# Patient Record
Sex: MALE | Race: WHITE | NOT HISPANIC OR LATINO | Employment: FULL TIME | URBAN - NONMETROPOLITAN AREA
[De-identification: names, ages, dates, MRNs, and addresses within clinical notes are randomized per-mention and may not be internally consistent; named-entity substitution may affect disease eponyms.]

---

## 2023-03-20 DIAGNOSIS — E11.65 POORLY CONTROLLED TYPE 2 DIABETES MELLITUS (MULTI): ICD-10-CM

## 2023-03-20 DIAGNOSIS — E78.5 HYPERLIPIDEMIA, UNSPECIFIED HYPERLIPIDEMIA TYPE: ICD-10-CM

## 2023-03-20 RX ORDER — METFORMIN HYDROCHLORIDE 500 MG/1
500 TABLET, EXTENDED RELEASE ORAL 4 TIMES DAILY
Qty: 120 TABLET | Refills: 11 | OUTPATIENT
Start: 2023-03-20

## 2023-03-20 RX ORDER — METFORMIN HYDROCHLORIDE 500 MG/1
500 TABLET, EXTENDED RELEASE ORAL 4 TIMES DAILY
COMMUNITY
Start: 2020-03-06 | End: 2023-03-23 | Stop reason: SDUPTHER

## 2023-03-20 NOTE — TELEPHONE ENCOUNTER
Needs appointment, can send in enough to last until then once he makes it. Had to cancel appt in January.

## 2023-03-23 RX ORDER — METFORMIN HYDROCHLORIDE 500 MG/1
2000 TABLET, EXTENDED RELEASE ORAL DAILY
Qty: 120 TABLET | Refills: 1 | Status: SHIPPED | OUTPATIENT
Start: 2023-03-23 | End: 2023-05-26 | Stop reason: SDUPTHER

## 2023-03-23 RX ORDER — PRAVASTATIN SODIUM 20 MG/1
20 TABLET ORAL DAILY
COMMUNITY
Start: 2020-01-28 | End: 2023-03-23 | Stop reason: SDUPTHER

## 2023-03-23 RX ORDER — PRAVASTATIN SODIUM 20 MG/1
20 TABLET ORAL DAILY
Qty: 30 TABLET | Refills: 1 | Status: SHIPPED | OUTPATIENT
Start: 2023-03-23 | End: 2023-05-26 | Stop reason: SDUPTHER

## 2023-04-07 LAB
ALANINE AMINOTRANSFERASE (SGPT) (U/L) IN SER/PLAS: 22 U/L (ref 10–52)
ALBUMIN (G/DL) IN SER/PLAS: 4.1 G/DL (ref 3.4–5)
ALKALINE PHOSPHATASE (U/L) IN SER/PLAS: 42 U/L (ref 33–120)
ANION GAP IN SER/PLAS: 10 MMOL/L (ref 10–20)
ASPARTATE AMINOTRANSFERASE (SGOT) (U/L) IN SER/PLAS: 17 U/L (ref 9–39)
BASOPHILS (10*3/UL) IN BLOOD BY AUTOMATED COUNT: 0.07 X10E9/L (ref 0–0.1)
BASOPHILS/100 LEUKOCYTES IN BLOOD BY AUTOMATED COUNT: 1 % (ref 0–2)
BILIRUBIN TOTAL (MG/DL) IN SER/PLAS: 0.5 MG/DL (ref 0–1.2)
CALCIUM (MG/DL) IN SER/PLAS: 9.4 MG/DL (ref 8.6–10.3)
CARBON DIOXIDE, TOTAL (MMOL/L) IN SER/PLAS: 29 MMOL/L (ref 21–32)
CHLORIDE (MMOL/L) IN SER/PLAS: 101 MMOL/L (ref 98–107)
CHOLESTEROL (MG/DL) IN SER/PLAS: 186 MG/DL (ref 0–199)
CHOLESTEROL IN HDL (MG/DL) IN SER/PLAS: 50 MG/DL
CHOLESTEROL/HDL RATIO: 3.7
COBALAMIN (VITAMIN B12) (PG/ML) IN SER/PLAS: 241 PG/ML (ref 211–911)
CREATININE (MG/DL) IN SER/PLAS: 0.81 MG/DL (ref 0.5–1.3)
EOSINOPHILS (10*3/UL) IN BLOOD BY AUTOMATED COUNT: 0.34 X10E9/L (ref 0–0.7)
EOSINOPHILS/100 LEUKOCYTES IN BLOOD BY AUTOMATED COUNT: 4.9 % (ref 0–6)
ERYTHROCYTE DISTRIBUTION WIDTH (RATIO) BY AUTOMATED COUNT: 13.3 % (ref 11.5–14.5)
ERYTHROCYTE MEAN CORPUSCULAR HEMOGLOBIN CONCENTRATION (G/DL) BY AUTOMATED: 32 G/DL (ref 32–36)
ERYTHROCYTE MEAN CORPUSCULAR VOLUME (FL) BY AUTOMATED COUNT: 87 FL (ref 80–100)
ERYTHROCYTES (10*6/UL) IN BLOOD BY AUTOMATED COUNT: 5.48 X10E12/L (ref 4.5–5.9)
ESTIMATED AVERAGE GLUCOSE FOR HBA1C: 263 MG/DL
GFR MALE: >90 ML/MIN/1.73M2
GLUCOSE (MG/DL) IN SER/PLAS: 279 MG/DL (ref 74–99)
HEMATOCRIT (%) IN BLOOD BY AUTOMATED COUNT: 47.8 % (ref 41–52)
HEMOGLOBIN (G/DL) IN BLOOD: 15.3 G/DL (ref 13.5–17.5)
HEMOGLOBIN A1C/HEMOGLOBIN TOTAL IN BLOOD: 10.8 %
IMMATURE GRANULOCYTES/100 LEUKOCYTES IN BLOOD BY AUTOMATED COUNT: 0.3 % (ref 0–0.9)
LDL: 102 MG/DL (ref 0–99)
LEUKOCYTES (10*3/UL) IN BLOOD BY AUTOMATED COUNT: 6.9 X10E9/L (ref 4.4–11.3)
LYMPHOCYTES (10*3/UL) IN BLOOD BY AUTOMATED COUNT: 2.24 X10E9/L (ref 1.2–4.8)
LYMPHOCYTES/100 LEUKOCYTES IN BLOOD BY AUTOMATED COUNT: 32.5 % (ref 13–44)
MONOCYTES (10*3/UL) IN BLOOD BY AUTOMATED COUNT: 0.62 X10E9/L (ref 0.1–1)
MONOCYTES/100 LEUKOCYTES IN BLOOD BY AUTOMATED COUNT: 9 % (ref 2–10)
NEUTROPHILS (10*3/UL) IN BLOOD BY AUTOMATED COUNT: 3.61 X10E9/L (ref 1.2–7.7)
NEUTROPHILS/100 LEUKOCYTES IN BLOOD BY AUTOMATED COUNT: 52.3 % (ref 40–80)
PLATELETS (10*3/UL) IN BLOOD AUTOMATED COUNT: 363 X10E9/L (ref 150–450)
POTASSIUM (MMOL/L) IN SER/PLAS: 4.2 MMOL/L (ref 3.5–5.3)
PROTEIN TOTAL: 6.8 G/DL (ref 6.4–8.2)
SODIUM (MMOL/L) IN SER/PLAS: 136 MMOL/L (ref 136–145)
TRIGLYCERIDE (MG/DL) IN SER/PLAS: 169 MG/DL (ref 0–149)
UREA NITROGEN (MG/DL) IN SER/PLAS: 19 MG/DL (ref 6–23)
VLDL: 34 MG/DL (ref 0–40)

## 2023-04-13 PROBLEM — E11.65 TYPE 2 DIABETES MELLITUS WITH HYPERGLYCEMIA, WITHOUT LONG-TERM CURRENT USE OF INSULIN (MULTI): Status: ACTIVE | Noted: 2023-04-13

## 2023-04-13 PROBLEM — J30.9 ALLERGIC RHINITIS: Status: ACTIVE | Noted: 2023-04-13

## 2023-04-13 PROBLEM — Z91.199 GENERAL PATIENT NONCOMPLIANCE: Status: ACTIVE | Noted: 2023-04-13

## 2023-04-13 PROBLEM — F41.9 ANXIETY: Status: ACTIVE | Noted: 2023-04-13

## 2023-04-13 PROBLEM — I10 HYPERTENSION: Status: ACTIVE | Noted: 2023-04-13

## 2023-04-13 PROBLEM — E55.9 VITAMIN D DEFICIENCY: Status: ACTIVE | Noted: 2023-04-13

## 2023-04-13 PROBLEM — E78.5 HYPERLIPIDEMIA: Status: ACTIVE | Noted: 2023-04-13

## 2023-04-13 RX ORDER — SEMAGLUTIDE 1.34 MG/ML
1 INJECTION, SOLUTION SUBCUTANEOUS
COMMUNITY
End: 2023-07-20 | Stop reason: SDUPTHER

## 2023-04-13 RX ORDER — LISINOPRIL 40 MG/1
40 TABLET ORAL DAILY
COMMUNITY
Start: 2020-01-28 | End: 2023-05-26 | Stop reason: SDUPTHER

## 2023-04-13 RX ORDER — PAROXETINE 30 MG/1
30 TABLET, FILM COATED ORAL DAILY
COMMUNITY
Start: 2019-12-10 | End: 2023-05-26 | Stop reason: SDUPTHER

## 2023-04-13 RX ORDER — PIOGLITAZONEHYDROCHLORIDE 45 MG/1
45 TABLET ORAL DAILY
COMMUNITY
Start: 2021-03-12 | End: 2023-05-26 | Stop reason: SDUPTHER

## 2023-04-13 RX ORDER — VIT C/E/ZN/COPPR/LUTEIN/ZEAXAN 250MG-90MG
25 CAPSULE ORAL
COMMUNITY
Start: 2020-10-23

## 2023-04-13 RX ORDER — GLIMEPIRIDE 4 MG/1
4 TABLET ORAL 2 TIMES DAILY
COMMUNITY
Start: 2019-11-18 | End: 2023-05-26 | Stop reason: SDUPTHER

## 2023-04-13 RX ORDER — MULTIVITAMIN
1 TABLET ORAL DAILY
COMMUNITY

## 2023-04-14 ENCOUNTER — OFFICE VISIT (OUTPATIENT)
Dept: PRIMARY CARE | Facility: CLINIC | Age: 58
End: 2023-04-14
Payer: COMMERCIAL

## 2023-04-14 VITALS
WEIGHT: 223.5 LBS | HEART RATE: 64 BPM | HEIGHT: 69 IN | DIASTOLIC BLOOD PRESSURE: 76 MMHG | SYSTOLIC BLOOD PRESSURE: 138 MMHG | BODY MASS INDEX: 33.1 KG/M2

## 2023-04-14 DIAGNOSIS — E78.49 OTHER HYPERLIPIDEMIA: ICD-10-CM

## 2023-04-14 DIAGNOSIS — F41.9 ANXIETY: ICD-10-CM

## 2023-04-14 DIAGNOSIS — I10 PRIMARY HYPERTENSION: Primary | ICD-10-CM

## 2023-04-14 DIAGNOSIS — E11.65 TYPE 2 DIABETES MELLITUS WITH HYPERGLYCEMIA, WITHOUT LONG-TERM CURRENT USE OF INSULIN (MULTI): ICD-10-CM

## 2023-04-14 DIAGNOSIS — Z91.199 GENERAL PATIENT NONCOMPLIANCE: ICD-10-CM

## 2023-04-14 PROCEDURE — 3046F HEMOGLOBIN A1C LEVEL >9.0%: CPT | Performed by: FAMILY MEDICINE

## 2023-04-14 PROCEDURE — 4010F ACE/ARB THERAPY RXD/TAKEN: CPT | Performed by: FAMILY MEDICINE

## 2023-04-14 PROCEDURE — 3078F DIAST BP <80 MM HG: CPT | Performed by: FAMILY MEDICINE

## 2023-04-14 PROCEDURE — 3075F SYST BP GE 130 - 139MM HG: CPT | Performed by: FAMILY MEDICINE

## 2023-04-14 PROCEDURE — 1036F TOBACCO NON-USER: CPT | Performed by: FAMILY MEDICINE

## 2023-04-14 PROCEDURE — 99214 OFFICE O/P EST MOD 30 MIN: CPT | Performed by: FAMILY MEDICINE

## 2023-04-14 NOTE — PROGRESS NOTES
Patient presents for periodic surveillance of chronic medical problems.     Subjective  Vince Valle is a 57 y.o. male who presents for Med check.  HPI  DM poorly controlled, current a1c 10.8, and for last two years at minimum has been above 10.3.  struggles with dietary , medication and followup compliance.  Risks reviewed.  Has declined insulin and endocrine evaluation consistently.   He states he misses the ozempic, and the  morning doses of his medicines frequently.   Advised I am referring him to endocrine .    HTN, stable  Hyperlipidemia, on statin    Review of Systems   All other systems reviewed and are negative.  .    Objective     Visit Vitals  /76   Pulse 64      Physical Exam  Vitals and nursing note reviewed.   Constitutional:       General: He is not in acute distress.     Appearance: Normal appearance. He is not toxic-appearing.   HENT:      Head: Normocephalic and atraumatic.   Cardiovascular:      Rate and Rhythm: Normal rate and regular rhythm.      Heart sounds: No murmur heard.  Pulmonary:      Effort: Pulmonary effort is normal.      Breath sounds: Normal breath sounds.   Abdominal:      Palpations: Abdomen is soft.   Musculoskeletal:      Cervical back: Neck supple. No rigidity.      Comments:     Skin:     General: Skin is warm and dry.   Neurological:      General: No focal deficit present.      Mental Status: He is alert and oriented to person, place, and time.   Psychiatric:         Mood and Affect: Mood normal.         Behavior: Behavior normal.       Orders Only on 04/07/2023   Component Date Value Ref Range Status    Cholesterol 04/07/2023 186  0 - 199 mg/dL Final    Comment: .      AGE      DESIRABLE   BORDERLINE HIGH   HIGH     0-19 Y     0 - 169       170 - 199     >/= 200    20-24 Y     0 - 189       190 - 224     >/= 225         >24 Y     0 - 199       200 - 239     >/= 240   **All ranges are based on fasting samples. Specific   therapeutic targets will vary based on  patient-specific   cardiac risk.  .   Pediatric guidelines reference:Pediatrics 2011, 128(S5).   Adult guidelines reference: NCEP ATPIII Guidelines,     STEPHANIE 2001, 258:2486-97  .   Venipuncture immediately after or during the    administration of Metamizole may lead to falsely   low results. Testing should be performed immediately   prior to Metamizole dosing.      HDL 04/07/2023 50.0  mg/dL Final    Comment: .      AGE      VERY LOW   LOW     NORMAL    HIGH       0-19 Y       < 35   < 40     40-45     ----    20-24 Y       ----   < 40       >45     ----      >24 Y       ----   < 40     40-60      >60  .      Cholesterol/HDL Ratio 04/07/2023 3.7   Final    Comment: REF VALUES  DESIRABLE  < 3.4  HIGH RISK  > 5.0      LDL 04/07/2023 102 (H)  0 - 99 mg/dL Final    Comment: .                           NEAR      BORD      AGE      DESIRABLE  OPTIMAL    HIGH     HIGH     VERY HIGH     0-19 Y     0 - 109     ---    110-129   >/= 130     ----    20-24 Y     0 - 119     ---    120-159   >/= 160     ----      >24 Y     0 -  99   100-129  130-159   160-189     >/=190  .      VLDL 04/07/2023 34  0 - 40 mg/dL Final    Triglycerides 04/07/2023 169 (H)  0 - 149 mg/dL Final    Comment: .      AGE      DESIRABLE   BORDERLINE HIGH   HIGH     VERY HIGH   0 D-90 D    19 - 174         ----         ----        ----  91 D- 9 Y     0 -  74        75 -  99     >/= 100      ----    10-19 Y     0 -  89        90 - 129     >/= 130      ----    20-24 Y     0 - 114       115 - 149     >/= 150      ----         >24 Y     0 - 149       150 - 199    200- 499    >/= 500  .   Venipuncture immediately after or during the    administration of Metamizole may lead to falsely   low results. Testing should be performed immediately   prior to Metamizole dosing.     Orders Only on 04/07/2023   Component Date Value Ref Range Status    WBC 04/07/2023 6.9  4.4 - 11.3 x10E9/L Final    RBC 04/07/2023 5.48  4.50 - 5.90 x10E12/L Final    Hemoglobin 04/07/2023 15.3   13.5 - 17.5 g/dL Final    Hematocrit 04/07/2023 47.8  41.0 - 52.0 % Final    MCV 04/07/2023 87  80 - 100 fL Final    MCHC 04/07/2023 32.0  32.0 - 36.0 g/dL Final    Platelets 04/07/2023 363  150 - 450 x10E9/L Final    RDW 04/07/2023 13.3  11.5 - 14.5 % Final    Neutrophils % 04/07/2023 52.3  40.0 - 80.0 % Final    Immature Granulocytes %, Automated 04/07/2023 0.3  0.0 - 0.9 % Final    Comment:  Immature Granulocyte Count (IG) includes promyelocytes,    myelocytes and metamyelocytes but does not include bands.   Percent differential counts (%) should be interpreted in the   context of the absolute cell counts (cells/L).      Lymphocytes % 04/07/2023 32.5  13.0 - 44.0 % Final    Monocytes % 04/07/2023 9.0  2.0 - 10.0 % Final    Eosinophils % 04/07/2023 4.9  0.0 - 6.0 % Final    Basophils % 04/07/2023 1.0  0.0 - 2.0 % Final    Neutrophils Absolute 04/07/2023 3.61  1.20 - 7.70 x10E9/L Final    Comment:  Percent differential counts (%) should be interpreted in the   context of the absolute cell counts (cells/L).      Lymphocytes Absolute 04/07/2023 2.24  1.20 - 4.80 x10E9/L Final    Monocytes Absolute 04/07/2023 0.62  0.10 - 1.00 x10E9/L Final    Eosinophils Absolute 04/07/2023 0.34  0.00 - 0.70 x10E9/L Final    Basophils Absolute 04/07/2023 0.07  0.00 - 0.10 x10E9/L Final   Orders Only on 04/07/2023   Component Date Value Ref Range Status    Vitamin B-12 04/07/2023 241  211 - 911 pg/mL Final   Orders Only on 04/07/2023   Component Date Value Ref Range Status    Glucose 04/07/2023 279 (H)  74 - 99 mg/dL Final    Sodium 04/07/2023 136  136 - 145 mmol/L Final    Potassium 04/07/2023 4.2  3.5 - 5.3 mmol/L Final    Chloride 04/07/2023 101  98 - 107 mmol/L Final    Bicarbonate 04/07/2023 29  21 - 32 mmol/L Final    Anion Gap 04/07/2023 10  10 - 20 mmol/L Final    Urea Nitrogen 04/07/2023 19  6 - 23 mg/dL Final    Creatinine 04/07/2023 0.81  0.50 - 1.30 mg/dL Final    GFR MALE 04/07/2023 >90  >90 mL/min/1.73m2 Final     Comment:  CALCULATIONS OF ESTIMATED GFR ARE PERFORMED   USING THE 2021 CKD-EPI STUDY REFIT EQUATION   WITHOUT THE RACE VARIABLE FOR THE IDMS-TRACEABLE   CREATININE METHODS.    https://jasn.asnjournals.org/content/early/2021/09/22/ASN.4692773563      Calcium 04/07/2023 9.4  8.6 - 10.3 mg/dL Final    Albumin 04/07/2023 4.1  3.4 - 5.0 g/dL Final    Alkaline Phosphatase 04/07/2023 42  33 - 120 U/L Final    Total Protein 04/07/2023 6.8  6.4 - 8.2 g/dL Final    AST 04/07/2023 17  9 - 39 U/L Final    Total Bilirubin 04/07/2023 0.5  0.0 - 1.2 mg/dL Final    ALT (SGPT) 04/07/2023 22  10 - 52 U/L Final    Comment:  Patients treated with Sulfasalazine may generate    falsely decreased results for ALT.     Orders Only on 04/07/2023   Component Date Value Ref Range Status    Hemoglobin A1C 04/07/2023 10.8 (A)  % Final    Comment:      Diagnosis of Diabetes-Adults   Non-Diabetic: < or = 5.6%   Increased risk for developing diabetes: 5.7-6.4%   Diagnostic of diabetes: > or = 6.5%  .       Monitoring of Diabetes                Age (y)     Therapeutic Goal (%)   Adults:          >18           <7.0   Pediatrics:    13-18           <7.5                   7-12           <8.0                   0- 6            7.5-8.5   American Diabetes Association. Diabetes Care 33(S1), Jan 2010.      Estimated Average Glucose 04/07/2023 263  MG/DL Final      Assessment/Plan   Problem List Items Addressed This Visit          Circulatory    Hypertension - Primary       Endocrine/Metabolic    Type 2 diabetes mellitus with hyperglycemia, without long-term current use of insulin (CMS/[facility])    Relevant Orders    Follow Up In Primary Care    Referral to Endocrinology       Other    Anxiety    General patient noncompliance    Hyperlipidemia              Rosalind Reyes MD

## 2023-05-26 DIAGNOSIS — E11.65 POORLY CONTROLLED TYPE 2 DIABETES MELLITUS (MULTI): ICD-10-CM

## 2023-05-26 DIAGNOSIS — F41.9 ANXIETY: ICD-10-CM

## 2023-05-26 DIAGNOSIS — E78.5 HYPERLIPIDEMIA, UNSPECIFIED HYPERLIPIDEMIA TYPE: ICD-10-CM

## 2023-05-26 DIAGNOSIS — I10 PRIMARY HYPERTENSION: ICD-10-CM

## 2023-05-26 RX ORDER — PIOGLITAZONEHYDROCHLORIDE 45 MG/1
45 TABLET ORAL DAILY
Qty: 90 TABLET | Refills: 0 | Status: SHIPPED | OUTPATIENT
Start: 2023-05-26

## 2023-05-26 RX ORDER — PRAVASTATIN SODIUM 20 MG/1
20 TABLET ORAL DAILY
Qty: 90 TABLET | Refills: 0 | Status: SHIPPED | OUTPATIENT
Start: 2023-05-26 | End: 2023-08-16 | Stop reason: SDUPTHER

## 2023-05-26 RX ORDER — METFORMIN HYDROCHLORIDE 500 MG/1
2000 TABLET, EXTENDED RELEASE ORAL DAILY
Qty: 120 TABLET | Refills: 0 | Status: SHIPPED | OUTPATIENT
Start: 2023-05-26 | End: 2023-06-27 | Stop reason: SDUPTHER

## 2023-05-26 RX ORDER — LISINOPRIL 40 MG/1
40 TABLET ORAL DAILY
Qty: 90 TABLET | Refills: 0 | Status: SHIPPED | OUTPATIENT
Start: 2023-05-26 | End: 2023-08-16 | Stop reason: SDUPTHER

## 2023-05-26 RX ORDER — PAROXETINE 30 MG/1
30 TABLET, FILM COATED ORAL DAILY
Qty: 90 TABLET | Refills: 0 | Status: SHIPPED | OUTPATIENT
Start: 2023-05-26 | End: 2023-08-16 | Stop reason: SDUPTHER

## 2023-05-26 RX ORDER — GLIMEPIRIDE 4 MG/1
4 TABLET ORAL 2 TIMES DAILY
Qty: 180 TABLET | Refills: 0 | Status: SHIPPED | OUTPATIENT
Start: 2023-05-26 | End: 2023-08-16 | Stop reason: SDUPTHER

## 2023-05-30 DIAGNOSIS — E11.65 POORLY CONTROLLED TYPE 2 DIABETES MELLITUS (MULTI): ICD-10-CM

## 2023-05-30 RX ORDER — METFORMIN HYDROCHLORIDE 500 MG/1
2000 TABLET, EXTENDED RELEASE ORAL DAILY
Qty: 120 TABLET | Refills: 5 | OUTPATIENT
Start: 2023-05-30

## 2023-06-27 ENCOUNTER — TELEPHONE (OUTPATIENT)
Dept: PRIMARY CARE | Facility: CLINIC | Age: 58
End: 2023-06-27
Payer: COMMERCIAL

## 2023-06-27 DIAGNOSIS — E11.65 POORLY CONTROLLED TYPE 2 DIABETES MELLITUS (MULTI): ICD-10-CM

## 2023-06-27 RX ORDER — METFORMIN HYDROCHLORIDE 500 MG/1
2000 TABLET, EXTENDED RELEASE ORAL DAILY
Qty: 120 TABLET | Refills: 1 | Status: SHIPPED | OUTPATIENT
Start: 2023-06-27 | End: 2024-01-18 | Stop reason: SDUPTHER

## 2023-07-20 DIAGNOSIS — E11.65 TYPE 2 DIABETES MELLITUS WITH HYPERGLYCEMIA, WITHOUT LONG-TERM CURRENT USE OF INSULIN (MULTI): ICD-10-CM

## 2023-07-20 RX ORDER — SEMAGLUTIDE 1.34 MG/ML
1 INJECTION, SOLUTION SUBCUTANEOUS
Qty: 3 ML | Refills: 0 | Status: SHIPPED | OUTPATIENT
Start: 2023-07-20 | End: 2023-10-13

## 2023-08-16 DIAGNOSIS — E11.65 POORLY CONTROLLED TYPE 2 DIABETES MELLITUS (MULTI): ICD-10-CM

## 2023-08-16 DIAGNOSIS — F41.9 ANXIETY: ICD-10-CM

## 2023-08-16 DIAGNOSIS — E78.5 HYPERLIPIDEMIA, UNSPECIFIED HYPERLIPIDEMIA TYPE: ICD-10-CM

## 2023-08-16 DIAGNOSIS — I10 PRIMARY HYPERTENSION: ICD-10-CM

## 2023-08-16 RX ORDER — PIOGLITAZONEHYDROCHLORIDE 45 MG/1
45 TABLET ORAL DAILY
Qty: 90 TABLET | Refills: 1 | Status: CANCELLED | OUTPATIENT
Start: 2023-08-16

## 2023-08-17 RX ORDER — GLIMEPIRIDE 4 MG/1
4 TABLET ORAL 2 TIMES DAILY
Qty: 180 TABLET | Refills: 1 | Status: SHIPPED | OUTPATIENT
Start: 2023-08-17

## 2023-08-17 RX ORDER — PRAVASTATIN SODIUM 20 MG/1
20 TABLET ORAL DAILY
Qty: 90 TABLET | Refills: 1 | Status: SHIPPED | OUTPATIENT
Start: 2023-08-17 | End: 2024-01-16 | Stop reason: SDUPTHER

## 2023-08-17 RX ORDER — LISINOPRIL 40 MG/1
40 TABLET ORAL DAILY
Qty: 90 TABLET | Refills: 1 | Status: SHIPPED | OUTPATIENT
Start: 2023-08-17 | End: 2024-01-16 | Stop reason: SDUPTHER

## 2023-08-17 RX ORDER — PAROXETINE 30 MG/1
30 TABLET, FILM COATED ORAL DAILY
Qty: 90 TABLET | Refills: 1 | Status: SHIPPED | OUTPATIENT
Start: 2023-08-17 | End: 2024-01-16 | Stop reason: SDUPTHER

## 2023-09-05 DIAGNOSIS — E11.65 POORLY CONTROLLED TYPE 2 DIABETES MELLITUS (MULTI): ICD-10-CM

## 2023-09-05 RX ORDER — PIOGLITAZONEHYDROCHLORIDE 45 MG/1
45 TABLET ORAL DAILY
Qty: 90 TABLET | Refills: 1 | OUTPATIENT
Start: 2023-09-05

## 2023-09-05 RX ORDER — PIOGLITAZONEHYDROCHLORIDE 45 MG/1
45 TABLET ORAL DAILY
Qty: 90 TABLET | Refills: 0 | OUTPATIENT
Start: 2023-09-05

## 2023-09-08 DIAGNOSIS — E11.65 POORLY CONTROLLED TYPE 2 DIABETES MELLITUS (MULTI): ICD-10-CM

## 2023-09-08 RX ORDER — PIOGLITAZONEHYDROCHLORIDE 45 MG/1
45 TABLET ORAL DAILY
Qty: 90 TABLET | Refills: 0 | OUTPATIENT
Start: 2023-09-08

## 2023-09-21 DIAGNOSIS — E11.65 POORLY CONTROLLED TYPE 2 DIABETES MELLITUS (MULTI): ICD-10-CM

## 2023-09-21 RX ORDER — METFORMIN HYDROCHLORIDE 500 MG/1
2000 TABLET, EXTENDED RELEASE ORAL DAILY
Qty: 120 TABLET | Refills: 1 | OUTPATIENT
Start: 2023-09-21

## 2023-10-13 ENCOUNTER — OFFICE VISIT (OUTPATIENT)
Dept: PRIMARY CARE | Facility: CLINIC | Age: 58
End: 2023-10-13
Payer: COMMERCIAL

## 2023-10-13 VITALS
HEART RATE: 64 BPM | BODY MASS INDEX: 32.44 KG/M2 | HEIGHT: 69 IN | SYSTOLIC BLOOD PRESSURE: 132 MMHG | DIASTOLIC BLOOD PRESSURE: 68 MMHG | WEIGHT: 219 LBS

## 2023-10-13 DIAGNOSIS — I10 PRIMARY HYPERTENSION: ICD-10-CM

## 2023-10-13 DIAGNOSIS — E78.49 OTHER HYPERLIPIDEMIA: ICD-10-CM

## 2023-10-13 DIAGNOSIS — E11.65 TYPE 2 DIABETES MELLITUS WITH HYPERGLYCEMIA, WITHOUT LONG-TERM CURRENT USE OF INSULIN (MULTI): ICD-10-CM

## 2023-10-13 DIAGNOSIS — Z28.21 INFLUENZA VACCINATION DECLINED: Primary | ICD-10-CM

## 2023-10-13 PROCEDURE — 99214 OFFICE O/P EST MOD 30 MIN: CPT | Performed by: FAMILY MEDICINE

## 2023-10-13 PROCEDURE — 3046F HEMOGLOBIN A1C LEVEL >9.0%: CPT | Performed by: FAMILY MEDICINE

## 2023-10-13 PROCEDURE — 1036F TOBACCO NON-USER: CPT | Performed by: FAMILY MEDICINE

## 2023-10-13 PROCEDURE — 3075F SYST BP GE 130 - 139MM HG: CPT | Performed by: FAMILY MEDICINE

## 2023-10-13 PROCEDURE — 3078F DIAST BP <80 MM HG: CPT | Performed by: FAMILY MEDICINE

## 2023-10-13 PROCEDURE — 4010F ACE/ARB THERAPY RXD/TAKEN: CPT | Performed by: FAMILY MEDICINE

## 2023-10-13 RX ORDER — SEMAGLUTIDE 2.68 MG/ML
INJECTION, SOLUTION SUBCUTANEOUS
COMMUNITY
End: 2024-04-15 | Stop reason: SDUPTHER

## 2023-10-13 RX ORDER — EMPAGLIFLOZIN 10 MG/1
1 TABLET, FILM COATED ORAL DAILY
COMMUNITY
Start: 2023-08-04 | End: 2023-11-10 | Stop reason: ALTCHOICE

## 2023-10-13 NOTE — PROGRESS NOTES
Patient presents for periodic surveillance of chronic medical problems.     Subjective  Vince Valle is a 58 y.o. male who presents for Annual Exam.  HPI  DM, has been to endocrine, has followup appt with labs next week, meds adjusted working on getting more steps in  HTN, stable  Hyperlipidemia on statin  Anxiety, stable on current regimen  Denies problems or concerns      Review of Systems   All other systems reviewed and are negative.  .    Objective     Visit Vitals  /68   Pulse 64      Physical Exam  Vitals and nursing note reviewed.   Constitutional:       Appearance: Normal appearance.   HENT:      Head: Normocephalic and atraumatic.      Right Ear: External ear normal.   Cardiovascular:      Rate and Rhythm: Normal rate and regular rhythm.      Heart sounds: No murmur heard.  Pulmonary:      Effort: Pulmonary effort is normal.      Breath sounds: Normal breath sounds.   Musculoskeletal:      Comments: Normal gait   Skin:     General: Skin is warm and dry.   Neurological:      General: No focal deficit present.      Mental Status: He is alert and oriented to person, place, and time.   Psychiatric:         Mood and Affect: Mood normal.         Behavior: Behavior normal.         Assessment/Plan   Problem List Items Addressed This Visit       Hyperlipidemia    Hypertension    Type 2 diabetes mellitus with hyperglycemia, without long-term current use of insulin (CMS/[facility])     Other Visit Diagnoses       Influenza vaccination declined    -  Primary                   Rosalind Reyes MD

## 2023-11-03 ENCOUNTER — LAB (OUTPATIENT)
Dept: LAB | Facility: LAB | Age: 58
End: 2023-11-03
Payer: COMMERCIAL

## 2023-11-03 DIAGNOSIS — E11.65 TYPE 2 DIABETES MELLITUS WITH HYPERGLYCEMIA (MULTI): Primary | ICD-10-CM

## 2023-11-03 DIAGNOSIS — E78.5 HYPERLIPIDEMIA, UNSPECIFIED: ICD-10-CM

## 2023-11-03 LAB
ALBUMIN SERPL BCP-MCNC: 4.3 G/DL (ref 3.4–5)
ANION GAP SERPL CALC-SCNC: 13 MMOL/L (ref 10–20)
BUN SERPL-MCNC: 19 MG/DL (ref 6–23)
CALCIUM SERPL-MCNC: 9.5 MG/DL (ref 8.6–10.3)
CHLORIDE SERPL-SCNC: 103 MMOL/L (ref 98–107)
CHOLEST SERPL-MCNC: 158 MG/DL (ref 0–199)
CHOLESTEROL/HDL RATIO: 3.5
CO2 SERPL-SCNC: 27 MMOL/L (ref 21–32)
CREAT SERPL-MCNC: 0.77 MG/DL (ref 0.5–1.3)
CREAT UR-MCNC: 81.3 MG/DL (ref 20–370)
EST. AVERAGE GLUCOSE BLD GHB EST-MCNC: 189 MG/DL
GFR SERPL CREATININE-BSD FRML MDRD: >90 ML/MIN/1.73M*2
GLUCOSE SERPL-MCNC: 152 MG/DL (ref 74–99)
HBA1C MFR BLD: 8.2 %
HDLC SERPL-MCNC: 45 MG/DL
LDLC SERPL CALC-MCNC: 82 MG/DL
MICROALBUMIN UR-MCNC: 28.3 MG/L
MICROALBUMIN/CREAT UR: 34.8 UG/MG CREAT
NON HDL CHOLESTEROL: 113 MG/DL (ref 0–149)
PHOSPHATE SERPL-MCNC: 4.3 MG/DL (ref 2.5–4.9)
POTASSIUM SERPL-SCNC: 4.4 MMOL/L (ref 3.5–5.3)
SODIUM SERPL-SCNC: 139 MMOL/L (ref 136–145)
TRIGL SERPL-MCNC: 153 MG/DL (ref 0–149)
VLDL: 31 MG/DL (ref 0–40)

## 2023-11-03 PROCEDURE — 82570 ASSAY OF URINE CREATININE: CPT

## 2023-11-03 PROCEDURE — 80069 RENAL FUNCTION PANEL: CPT

## 2023-11-03 PROCEDURE — 36415 COLL VENOUS BLD VENIPUNCTURE: CPT

## 2023-11-03 PROCEDURE — 82043 UR ALBUMIN QUANTITATIVE: CPT

## 2023-11-03 PROCEDURE — 83036 HEMOGLOBIN GLYCOSYLATED A1C: CPT

## 2023-11-03 PROCEDURE — 80061 LIPID PANEL: CPT

## 2023-11-10 ENCOUNTER — OFFICE VISIT (OUTPATIENT)
Dept: ENDOCRINOLOGY | Facility: HOSPITAL | Age: 58
End: 2023-11-10
Payer: COMMERCIAL

## 2023-11-10 VITALS
DIASTOLIC BLOOD PRESSURE: 85 MMHG | HEART RATE: 66 BPM | SYSTOLIC BLOOD PRESSURE: 153 MMHG | WEIGHT: 211 LBS | HEIGHT: 69 IN | TEMPERATURE: 97.2 F | BODY MASS INDEX: 31.25 KG/M2 | OXYGEN SATURATION: 97 %

## 2023-11-10 DIAGNOSIS — E11.65 TYPE 2 DIABETES MELLITUS WITH HYPERGLYCEMIA, WITHOUT LONG-TERM CURRENT USE OF INSULIN (MULTI): Primary | ICD-10-CM

## 2023-11-10 DIAGNOSIS — E78.49 OTHER HYPERLIPIDEMIA: ICD-10-CM

## 2023-11-10 LAB — GLUCOSE BLD MANUAL STRIP-MCNC: 143 MG/DL (ref 74–99)

## 2023-11-10 PROCEDURE — 99215 OFFICE O/P EST HI 40 MIN: CPT | Performed by: STUDENT IN AN ORGANIZED HEALTH CARE EDUCATION/TRAINING PROGRAM

## 2023-11-10 PROCEDURE — 82947 ASSAY GLUCOSE BLOOD QUANT: CPT | Performed by: STUDENT IN AN ORGANIZED HEALTH CARE EDUCATION/TRAINING PROGRAM

## 2023-11-10 PROCEDURE — 3077F SYST BP >= 140 MM HG: CPT | Performed by: STUDENT IN AN ORGANIZED HEALTH CARE EDUCATION/TRAINING PROGRAM

## 2023-11-10 PROCEDURE — 3048F LDL-C <100 MG/DL: CPT | Performed by: STUDENT IN AN ORGANIZED HEALTH CARE EDUCATION/TRAINING PROGRAM

## 2023-11-10 PROCEDURE — 36416 COLLJ CAPILLARY BLOOD SPEC: CPT | Performed by: STUDENT IN AN ORGANIZED HEALTH CARE EDUCATION/TRAINING PROGRAM

## 2023-11-10 PROCEDURE — 3061F NEG MICROALBUMINURIA REV: CPT | Performed by: STUDENT IN AN ORGANIZED HEALTH CARE EDUCATION/TRAINING PROGRAM

## 2023-11-10 PROCEDURE — 3079F DIAST BP 80-89 MM HG: CPT | Performed by: STUDENT IN AN ORGANIZED HEALTH CARE EDUCATION/TRAINING PROGRAM

## 2023-11-10 PROCEDURE — 4010F ACE/ARB THERAPY RXD/TAKEN: CPT | Performed by: STUDENT IN AN ORGANIZED HEALTH CARE EDUCATION/TRAINING PROGRAM

## 2023-11-10 PROCEDURE — 1036F TOBACCO NON-USER: CPT | Performed by: STUDENT IN AN ORGANIZED HEALTH CARE EDUCATION/TRAINING PROGRAM

## 2023-11-10 PROCEDURE — 3052F HG A1C>EQUAL 8.0%<EQUAL 9.0%: CPT | Performed by: STUDENT IN AN ORGANIZED HEALTH CARE EDUCATION/TRAINING PROGRAM

## 2023-11-10 SDOH — ECONOMIC STABILITY: FOOD INSECURITY: WITHIN THE PAST 12 MONTHS, YOU WORRIED THAT YOUR FOOD WOULD RUN OUT BEFORE YOU GOT MONEY TO BUY MORE.: NEVER TRUE

## 2023-11-10 SDOH — ECONOMIC STABILITY: FOOD INSECURITY: WITHIN THE PAST 12 MONTHS, THE FOOD YOU BOUGHT JUST DIDN'T LAST AND YOU DIDN'T HAVE MONEY TO GET MORE.: NEVER TRUE

## 2023-11-10 ASSESSMENT — ENCOUNTER SYMPTOMS
DIARRHEA: 0
NECK STIFFNESS: 0
DEPRESSION: 0
FREQUENCY: 0
DIFFICULTY URINATING: 0
DIZZINESS: 0
WHEEZING: 0
ABDOMINAL DISTENTION: 0
CHILLS: 0
EYE DISCHARGE: 0
POLYDIPSIA: 0
LOSS OF SENSATION IN FEET: 0
ACTIVITY CHANGE: 0
NUMBNESS: 0
VOMITING: 0
FATIGUE: 0
APPETITE CHANGE: 0
LIGHT-HEADEDNESS: 0
COLOR CHANGE: 0
NAUSEA: 0
ABDOMINAL PAIN: 0
COUGH: 0
AGITATION: 0
HALLUCINATIONS: 0
CHEST TIGHTNESS: 0
OCCASIONAL FEELINGS OF UNSTEADINESS: 0
POLYPHAGIA: 0
FLANK PAIN: 0
NECK PAIN: 0
CONSTIPATION: 0
SHORTNESS OF BREATH: 0
CONFUSION: 0
TREMORS: 0

## 2023-11-10 ASSESSMENT — LIFESTYLE VARIABLES
SKIP TO QUESTIONS 9-10: 1
HOW OFTEN DO YOU HAVE SIX OR MORE DRINKS ON ONE OCCASION: NEVER
HOW OFTEN DO YOU HAVE A DRINK CONTAINING ALCOHOL: NEVER
AUDIT-C TOTAL SCORE: 0
HOW MANY STANDARD DRINKS CONTAINING ALCOHOL DO YOU HAVE ON A TYPICAL DAY: PATIENT DOES NOT DRINK

## 2023-11-10 ASSESSMENT — PATIENT HEALTH QUESTIONNAIRE - PHQ9
1. LITTLE INTEREST OR PLEASURE IN DOING THINGS: NOT AT ALL
2. FEELING DOWN, DEPRESSED OR HOPELESS: NOT AT ALL
SUM OF ALL RESPONSES TO PHQ9 QUESTIONS 1 & 2: 0

## 2023-11-10 ASSESSMENT — PAIN SCALES - GENERAL: PAINLEVEL: 4

## 2023-11-10 NOTE — PATIENT INSTRUCTIONS
You are doing a great Job.  At this time no need for Insulin  Continue ozempic 2 mg weekly  Continue Metformin, glimepiride and pioglitazone  Increase Jardiance to 25 mg daily  Continue to monitor BG.  If low BG < 70 frequent let me know  Continue to watch diet and exercise  Continue pravastatin  Continue losartan  Ophthalmology follow up  Blood work in 3 months few days before you come in  Follow up in3 months

## 2023-11-10 NOTE — PROGRESS NOTES
Patient coming in for follow up for T2DM    Subjective   Vince Valle is a 58 y.o. male who presents for follow up for Type 2 diabetes mellitus.     Lab Results   Component Value Date    HGBA1C 8.2 (H) 11/03/2023     Mr. Valle is a 58 year old M with hx of T2DM, HTN and HLD referred for Diabetes management  Diabetes dx > 10 yrs ago  Last A1c November 2023 8.2%  Has been watching his diet trying to be more active  current meds:  Ozempic 2 mg weekly since August  Metformin 1000 mg twice daily  Pioglitazone 45 mg daily  Glimepiride 4 mg twice daily  Jardiance 10 mg OD  BG in am: 150-160  BG at before dinner: 130-140  BG after dinner: 180-190  Has been doing portion control  Breakfast: Toast with butter  Lunch: Granola bar and banana  Not snacking in between meals  Dinner: Pizza 2 slices. Moderation with salad  Hasn't been following with ophthalmology. Not yet  No numbness or tingling  Nephropathy on losartan  On pravastatin last LDL: 102  Joint ache.  More energetic and less achy  No nausea no vomiting  No abdominal pain  No issues with urine.  Urinating less frequent  Nocturia once a night    Review of Systems   Constitutional:  Negative for activity change, appetite change, chills and fatigue.   HENT:  Negative for congestion.    Eyes:  Negative for discharge.   Respiratory:  Negative for cough, chest tightness, shortness of breath and wheezing.    Cardiovascular:  Negative for chest pain.   Gastrointestinal:  Negative for abdominal distention, abdominal pain, constipation, diarrhea, nausea and vomiting.   Endocrine: Negative for cold intolerance, heat intolerance, polydipsia, polyphagia and polyuria.   Genitourinary:  Negative for difficulty urinating, flank pain, frequency and urgency.   Musculoskeletal:  Negative for neck pain and neck stiffness.   Skin:  Negative for color change and rash.   Neurological:  Negative for dizziness, tremors, syncope, light-headedness and numbness.   Psychiatric/Behavioral:   "Negative for agitation, confusion and hallucinations.        Objective   /85 (BP Location: Right arm, Patient Position: Sitting, BP Cuff Size: Adult)   Pulse 66   Temp 36.2 °C (97.2 °F) (Temporal)   Ht 1.753 m (5' 9\")   Wt 95.7 kg (211 lb)   SpO2 97%   BMI 31.16 kg/m²   Physical Exam  Constitutional:       General: He is not in acute distress.     Appearance: Normal appearance.   Eyes:      Extraocular Movements: Extraocular movements intact.      Pupils: Pupils are equal, round, and reactive to light.   Cardiovascular:      Rate and Rhythm: Normal rate and regular rhythm.   Pulmonary:      Effort: Pulmonary effort is normal. No respiratory distress.      Breath sounds: Normal breath sounds.   Abdominal:      General: Bowel sounds are normal.      Palpations: Abdomen is soft.      Tenderness: There is no abdominal tenderness.   Skin:     Coloration: Skin is not jaundiced or pale.      Findings: No erythema or rash.   Neurological:      General: No focal deficit present.      Mental Status: He is alert and oriented to person, place, and time.      Deep Tendon Reflexes: Reflexes normal.   Psychiatric:         Mood and Affect: Mood normal.         Behavior: Behavior normal.         Lab Review  Glucose (mg/dL)   Date Value   11/03/2023 152 (H)   04/07/2023 279 (H)   02/28/2022 294 (H)   03/06/2021 315 (H)     Hemoglobin A1C (%)   Date Value   11/03/2023 8.2 (H)   04/07/2023 10.8 (A)   09/30/2022 10.3 (A)   02/28/2022 11.2 (A)     Bicarbonate (mmol/L)   Date Value   11/03/2023 27   04/07/2023 29   02/28/2022 29   03/06/2021 29     Urea Nitrogen (mg/dL)   Date Value   11/03/2023 19   04/07/2023 19   02/28/2022 19   03/06/2021 16     Creatinine (mg/dL)   Date Value   11/03/2023 0.77   04/07/2023 0.81   02/28/2022 0.72   03/06/2021 0.77     Lab Results   Component Value Date    CHOL 158 11/03/2023    CHOL 186 04/07/2023    CHOL 214 (H) 02/28/2022     Lab Results   Component Value Date    HDL 45.0 11/03/2023    " "HDL 50.0 04/07/2023    HDL 52.0 02/28/2022     Lab Results   Component Value Date    LDLCALC 82 11/03/2023     Lab Results   Component Value Date    TRIG 153 (H) 11/03/2023    TRIG 169 (H) 04/07/2023    TRIG 167 (H) 02/28/2022     No components found for: \"CHOLHDL\"   Lab Results   Component Value Date    TSH 2.63 02/28/2022       Assessment/Plan     Mr. Valle is a 58 year old M with hx of T2DM, HTN and HLD referred for Diabetes management  Diabetes dx > 10 yrs ago  Last A1c November 2023 8.2%  Has been watching his diet trying to be more active  current meds:  Ozempic 2 mg weekly since August  Metformin 1000 mg twice daily  Pioglitazone 45 mg daily  Glimepiride 4 mg twice daily  Jardiance 10 mg OD  BG in am: 150-160, BG at before dinner: 130-140, BG after dinner: 180-190  Has been doing portion control  Hasn't been following with ophthalmology. Not yet  No numbness or tingling  Nephropathy on losartan  On pravastatin last LDL: 102    Plan:  At this time no need for Insulin  Continue ozempic 2 mg weekly  Continue Metformin, glimepiride and pioglitazone  Increase Jardiance to 25 mg daily  Continue to monitor BG.  If low BG < 70 frequent we asked patient to let us know  Continue to watch diet and exercise  Continue pravastatin  Continue losartan  Ophthalmology follow up  Blood work in 3 months few days before you come in  Follow up in3 months  "

## 2024-01-05 DIAGNOSIS — F41.9 ANXIETY: ICD-10-CM

## 2024-01-05 DIAGNOSIS — I10 PRIMARY HYPERTENSION: ICD-10-CM

## 2024-01-05 DIAGNOSIS — E78.5 HYPERLIPIDEMIA, UNSPECIFIED HYPERLIPIDEMIA TYPE: ICD-10-CM

## 2024-01-05 DIAGNOSIS — E11.65 POORLY CONTROLLED TYPE 2 DIABETES MELLITUS (MULTI): ICD-10-CM

## 2024-01-16 RX ORDER — PAROXETINE 30 MG/1
30 TABLET, FILM COATED ORAL DAILY
Qty: 90 TABLET | Refills: 1 | Status: SHIPPED | OUTPATIENT
Start: 2024-01-16

## 2024-01-16 RX ORDER — LISINOPRIL 40 MG/1
40 TABLET ORAL DAILY
Qty: 90 TABLET | Refills: 1 | Status: SHIPPED | OUTPATIENT
Start: 2024-01-16

## 2024-01-16 RX ORDER — METFORMIN HYDROCHLORIDE 500 MG/1
TABLET, EXTENDED RELEASE ORAL
Qty: 120 TABLET | Refills: 0 | OUTPATIENT
Start: 2024-01-16

## 2024-01-16 RX ORDER — PRAVASTATIN SODIUM 20 MG/1
20 TABLET ORAL DAILY
Qty: 90 TABLET | Refills: 1 | Status: SHIPPED | OUTPATIENT
Start: 2024-01-16

## 2024-01-18 DIAGNOSIS — E11.65 POORLY CONTROLLED TYPE 2 DIABETES MELLITUS (MULTI): ICD-10-CM

## 2024-01-18 RX ORDER — METFORMIN HYDROCHLORIDE 500 MG/1
2000 TABLET, EXTENDED RELEASE ORAL DAILY
Qty: 120 TABLET | Refills: 3 | Status: SHIPPED | OUTPATIENT
Start: 2024-01-18 | End: 2024-04-15 | Stop reason: SDUPTHER

## 2024-02-09 ENCOUNTER — LAB (OUTPATIENT)
Dept: LAB | Facility: LAB | Age: 59
End: 2024-02-09
Payer: COMMERCIAL

## 2024-02-09 DIAGNOSIS — E11.65 TYPE 2 DIABETES MELLITUS WITH HYPERGLYCEMIA, WITHOUT LONG-TERM CURRENT USE OF INSULIN (MULTI): ICD-10-CM

## 2024-02-09 LAB
ALBUMIN SERPL BCP-MCNC: 3.9 G/DL (ref 3.4–5)
ANION GAP SERPL CALC-SCNC: 11 MMOL/L (ref 10–20)
BUN SERPL-MCNC: 17 MG/DL (ref 6–23)
CALCIUM SERPL-MCNC: 9.2 MG/DL (ref 8.6–10.3)
CHLORIDE SERPL-SCNC: 105 MMOL/L (ref 98–107)
CO2 SERPL-SCNC: 27 MMOL/L (ref 21–32)
CREAT SERPL-MCNC: 0.66 MG/DL (ref 0.5–1.3)
EGFRCR SERPLBLD CKD-EPI 2021: >90 ML/MIN/1.73M*2
EST. AVERAGE GLUCOSE BLD GHB EST-MCNC: 197 MG/DL
GLUCOSE SERPL-MCNC: 153 MG/DL (ref 74–99)
HBA1C MFR BLD: 8.5 %
PHOSPHATE SERPL-MCNC: 3.6 MG/DL (ref 2.5–4.9)
POTASSIUM SERPL-SCNC: 4.1 MMOL/L (ref 3.5–5.3)
SODIUM SERPL-SCNC: 139 MMOL/L (ref 136–145)

## 2024-02-09 PROCEDURE — 83036 HEMOGLOBIN GLYCOSYLATED A1C: CPT

## 2024-02-09 PROCEDURE — 36415 COLL VENOUS BLD VENIPUNCTURE: CPT

## 2024-02-09 PROCEDURE — 80069 RENAL FUNCTION PANEL: CPT

## 2024-02-15 NOTE — PROGRESS NOTES
Patient coming in for follow up for T2DM    Subjective   Vince Valle is a 58 y.o. male who presents for follow up for Type 2 diabetes mellitus.   The initial diagnosis of diabetes was made {ago/age:08092}.   Lab Results   Component Value Date    HGBA1C 8.5 (H) 02/09/2024      Mr. Valle is a 58 year old M with hx of T2DM, HTN and HLD referred for Diabetes management  Diabetes dx > 10 yrs ago  Last A1c November 2023 8.2%  Has been watching his diet trying to be more active  current meds:  Ozempic 2 mg weekly since August  Metformin 1000 mg twice daily  Pioglitazone 45 mg daily  Glimepiride 4 mg twice daily  Jardiance 10 mg OD  BG in am: 150-160, BG at before dinner: 130-140, BG after dinner: 180-190  Has been doing portion control  Hasn't been following with ophthalmology. Not yet  No numbness or tingling  Nephropathy on losartan  On pravastatin last LDL: 102  Interval hx:    Current Diabetes meds:    The patient {is/is not:25183} currently checking the blood glucose *** times per day.    Patient is using: {glucometer/continuous glucose monitor:56443}    Hypoglycemia frequency: ***  Hypoglycemia awareness: {YES/NO:19443}    Diet:  Breakfast:  Lunch:  Dinner:  Snack:  Beverages:    Known complications due to diabetes included {Diagnoses; complications diabetes:1215}    Cardiovascular risk factors include {risk factors heart disease:510}. The patient {is/is not:60700} on an ACE inhibitor or angiotensin II receptor blocker.      Foot Exam:   Eye Exam:  Lipid Panel:  Urine Albumin:    The patient {HAS HAS NOT:95765} been previously hospitalized due to diabetic ketoacidosis.     Current symptoms/problems include {Symptoms; diabetes:82726}. Her {course:96368}.         Exercise: {exercise:20423}     Review of Systems    Objective   There were no vitals taken for this visit.  Physical Exam    Lab Review  Glucose (mg/dL)   Date Value   02/09/2024 153 (H)   11/03/2023 152 (H)   04/07/2023 279 (H)   02/28/2022 294 (H)  "  03/06/2021 315 (H)     Hemoglobin A1C (%)   Date Value   02/09/2024 8.5 (H)   11/03/2023 8.2 (H)   04/07/2023 10.8 (A)   09/30/2022 10.3 (A)   02/28/2022 11.2 (A)     Bicarbonate (mmol/L)   Date Value   02/09/2024 27   11/03/2023 27   04/07/2023 29   02/28/2022 29   03/06/2021 29     Urea Nitrogen (mg/dL)   Date Value   02/09/2024 17   11/03/2023 19   04/07/2023 19   02/28/2022 19   03/06/2021 16     Creatinine (mg/dL)   Date Value   02/09/2024 0.66   11/03/2023 0.77   04/07/2023 0.81   02/28/2022 0.72   03/06/2021 0.77     Lab Results   Component Value Date    CHOL 158 11/03/2023    CHOL 186 04/07/2023    CHOL 214 (H) 02/28/2022     Lab Results   Component Value Date    HDL 45.0 11/03/2023    HDL 50.0 04/07/2023    HDL 52.0 02/28/2022     Lab Results   Component Value Date    LDLCALC 82 11/03/2023     Lab Results   Component Value Date    TRIG 153 (H) 11/03/2023    TRIG 169 (H) 04/07/2023    TRIG 167 (H) 02/28/2022     No components found for: \"CHOLHDL\"   Lab Results   Component Value Date    TSH 2.63 02/28/2022     No results found for: \"ALBUR\", \"[id number]\"     Health Maintenance:       Assessment/Plan   There are no diagnoses linked to this encounter.  Type 2 diabetes mellitus, is {at goal/not at goal:31257}. ***  {Assess/Plan SmartLinks (Optional):73263}          Follow up: I recommend diabetes care be {Time; 1 month to 1 year:58640::\"3 months\"}.  "

## 2024-02-16 ENCOUNTER — APPOINTMENT (OUTPATIENT)
Dept: ENDOCRINOLOGY | Facility: HOSPITAL | Age: 59
End: 2024-02-16
Payer: COMMERCIAL

## 2024-03-21 DIAGNOSIS — E11.65 POORLY CONTROLLED TYPE 2 DIABETES MELLITUS (MULTI): ICD-10-CM

## 2024-03-21 RX ORDER — GLIMEPIRIDE 4 MG/1
4 TABLET ORAL 2 TIMES DAILY
Qty: 180 TABLET | Refills: 1 | OUTPATIENT
Start: 2024-03-21

## 2024-04-15 DIAGNOSIS — E11.65 POORLY CONTROLLED TYPE 2 DIABETES MELLITUS (MULTI): ICD-10-CM

## 2024-04-17 RX ORDER — METFORMIN HYDROCHLORIDE 500 MG/1
1000 TABLET, EXTENDED RELEASE ORAL
Qty: 120 TABLET | Refills: 3 | Status: SHIPPED | OUTPATIENT
Start: 2024-04-17

## 2024-04-17 RX ORDER — SEMAGLUTIDE 2.68 MG/ML
2 INJECTION, SOLUTION SUBCUTANEOUS
Qty: 9 ML | Refills: 1 | Status: SHIPPED | OUTPATIENT
Start: 2024-04-21

## 2024-07-01 ENCOUNTER — APPOINTMENT (OUTPATIENT)
Dept: ENDOCRINOLOGY | Facility: CLINIC | Age: 59
End: 2024-07-01
Payer: COMMERCIAL

## 2024-07-11 ENCOUNTER — APPOINTMENT (OUTPATIENT)
Dept: ENDOCRINOLOGY | Facility: CLINIC | Age: 59
End: 2024-07-11
Payer: COMMERCIAL

## 2024-07-11 VITALS
HEIGHT: 69 IN | DIASTOLIC BLOOD PRESSURE: 80 MMHG | HEART RATE: 53 BPM | SYSTOLIC BLOOD PRESSURE: 157 MMHG | BODY MASS INDEX: 31.64 KG/M2 | WEIGHT: 213.6 LBS | TEMPERATURE: 98.2 F

## 2024-07-11 DIAGNOSIS — E11.65 POORLY CONTROLLED TYPE 2 DIABETES MELLITUS (MULTI): ICD-10-CM

## 2024-07-11 DIAGNOSIS — E11.65 TYPE 2 DIABETES MELLITUS WITH HYPERGLYCEMIA, WITHOUT LONG-TERM CURRENT USE OF INSULIN (MULTI): ICD-10-CM

## 2024-07-11 PROCEDURE — 3008F BODY MASS INDEX DOCD: CPT | Performed by: STUDENT IN AN ORGANIZED HEALTH CARE EDUCATION/TRAINING PROGRAM

## 2024-07-11 PROCEDURE — 3077F SYST BP >= 140 MM HG: CPT | Performed by: STUDENT IN AN ORGANIZED HEALTH CARE EDUCATION/TRAINING PROGRAM

## 2024-07-11 PROCEDURE — 3079F DIAST BP 80-89 MM HG: CPT | Performed by: STUDENT IN AN ORGANIZED HEALTH CARE EDUCATION/TRAINING PROGRAM

## 2024-07-11 PROCEDURE — 3052F HG A1C>EQUAL 8.0%<EQUAL 9.0%: CPT | Performed by: STUDENT IN AN ORGANIZED HEALTH CARE EDUCATION/TRAINING PROGRAM

## 2024-07-11 PROCEDURE — 4010F ACE/ARB THERAPY RXD/TAKEN: CPT | Performed by: STUDENT IN AN ORGANIZED HEALTH CARE EDUCATION/TRAINING PROGRAM

## 2024-07-11 PROCEDURE — 99214 OFFICE O/P EST MOD 30 MIN: CPT | Performed by: STUDENT IN AN ORGANIZED HEALTH CARE EDUCATION/TRAINING PROGRAM

## 2024-07-11 RX ORDER — SEMAGLUTIDE 0.68 MG/ML
0.5 INJECTION, SOLUTION SUBCUTANEOUS
Qty: 3 ML | Refills: 3 | Status: SHIPPED | OUTPATIENT
Start: 2024-07-11

## 2024-07-11 RX ORDER — GLIPIZIDE 10 MG/1
10 TABLET ORAL
Qty: 180 TABLET | Refills: 3 | Status: SHIPPED | OUTPATIENT
Start: 2024-07-11 | End: 2025-07-11

## 2024-07-11 RX ORDER — INSULIN GLARGINE-YFGN 100 [IU]/ML
15 INJECTION, SOLUTION SUBCUTANEOUS EVERY 24 HOURS
Qty: 15 ML | Refills: 3 | Status: SHIPPED | OUTPATIENT
Start: 2024-07-11 | End: 2025-08-15

## 2024-07-11 RX ORDER — BLOOD-GLUCOSE SENSOR
EACH MISCELLANEOUS
Qty: 2 EACH | Refills: 11 | Status: SHIPPED | OUTPATIENT
Start: 2024-07-11

## 2024-07-11 RX ORDER — SEMAGLUTIDE 1.34 MG/ML
INJECTION, SOLUTION SUBCUTANEOUS
Qty: 1.5 ML | Refills: 0 | Status: SHIPPED | OUTPATIENT
Start: 2024-07-14 | End: 2024-07-11 | Stop reason: DRUGHIGH

## 2024-07-11 RX ORDER — METFORMIN HYDROCHLORIDE 500 MG/1
1000 TABLET, EXTENDED RELEASE ORAL
Qty: 120 TABLET | Refills: 3 | Status: SHIPPED | OUTPATIENT
Start: 2024-07-11

## 2024-07-11 RX ORDER — PEN NEEDLE, DIABETIC 30 GX3/16"
1 NEEDLE, DISPOSABLE MISCELLANEOUS DAILY
Qty: 90 EACH | Refills: 3 | Status: SHIPPED | OUTPATIENT
Start: 2024-07-11

## 2024-07-11 RX ORDER — INSULIN GLARGINE-YFGN 100 [IU]/ML
15 INJECTION, SOLUTION SUBCUTANEOUS EVERY 24 HOURS
Qty: 3 ML | Refills: 12 | Status: SHIPPED | OUTPATIENT
Start: 2024-07-11 | End: 2024-07-11 | Stop reason: SDUPTHER

## 2024-07-11 RX ORDER — SEMAGLUTIDE 1.34 MG/ML
1 INJECTION, SOLUTION SUBCUTANEOUS
Qty: 3 ML | Refills: 3 | Status: SHIPPED | OUTPATIENT
Start: 2024-08-12

## 2024-07-11 NOTE — PATIENT INSTRUCTIONS
Start Lantus 15 units at bed time.  Restart Ozempic 0.25 mg once weekly injection week 1 and week 2 if tolerated increase to 0.5 mg week 3 and 4, if tolerated increase to 1 mg weekly  Continue Jardiance 25 mg daily  Continue Metformin 1000 mg twice daily  Start Glipizide 10 mg twice daily before lunch and dinner.  Monitor Blood sugars and let me know if you start having low BG < 80 frequently or if BG remains frequently above 200.  We ordered freestyle kareem for continuous monitoring  Continue Pravastatin and lisinopril  Continue to watch diet and stay active    Blood work   RTC in 10 weeks

## 2024-07-11 NOTE — PROGRESS NOTES
"Patient coming in for follow up for T2DM    Subjective   Vince Valle is a 59 y.o. male who presents for follow up for Type 2 diabetes mellitus.     Lab Results   Component Value Date    HGBA1C 8.5 (H) 02/09/2024      Mr. Valle is a 59 year old M with hx of T2DM, HTN and HLD coming for Diabetes management  Diabetes dx > 10 yrs ago  Last A1c February 2024 8.5%  Has been watching his diet trying to be more active  Last seen in November and Jardiance increased to 25 mg daily  current meds:  Ozempic 2 mg weekly since August  Metformin 1000 mg twice daily  Pioglitazone 45 mg daily -Ran out of refills a month ago  Glimepiride 4 mg twice daily- Ran out 2-3 months  Jardiance 25 mg once daily  Lisinopril  Pravastatin  Was having side effects from ozempic (from January-February) 24-48 hours was Acid reflux tried TUMS having nausea and vomiting /Diarrhea  BG in am: 200, BG at before dinner: 300s  Diet:   Breakfast: Toast or [place] bar, Friday breakfast at restaurant, eggs sausage or biscuit and Gravy  Lunch: Does not eat usually   Dinner: Boneless skinless chicken and vegetables  Has been doing portion control  Hasn't been following with ophthalmology. Not yet  No numbness or tingling  Nephropathy on lisinopril  On pravastatin last LDL: 82  Physically active Job. 8000-22415 steps a day     Having Nausea and Vomiting with 2 mg Ozempic    Review of Systems  all pertinent systems reviewed and are otherwise negative   Objective   Visit Vitals  /80   Pulse 53   Temp 36.8 °C (98.2 °F)   Ht 1.753 m (5' 9\")   Wt 96.9 kg (213 lb 9.6 oz)   BMI 31.54 kg/m²   Smoking Status Former   BSA 2.17 m²      Physical Exam  Constitutional:       General: He is not in acute distress.     Appearance: Normal appearance.   Eyes:      Extraocular Movements: Extraocular movements intact.      Pupils: Pupils are equal, round, and reactive to light.   Cardiovascular:      Rate and Rhythm: Normal rate and regular rhythm.   Pulmonary:      Effort: " "Pulmonary effort is normal. No respiratory distress.      Breath sounds: Normal breath sounds.   Abdominal:      General: Bowel sounds are normal.      Palpations: Abdomen is soft.      Tenderness: There is no abdominal tenderness.   Skin:     Coloration: Skin is not jaundiced or pale.      Findings: No erythema or rash.   Neurological:      General: No focal deficit present.      Mental Status: He is alert and oriented to person, place, and time.      Deep Tendon Reflexes: Reflexes normal.   Psychiatric:         Mood and Affect: Mood normal.         Behavior: Behavior normal.         Lab Review  Glucose (mg/dL)   Date Value   02/09/2024 153 (H)   11/03/2023 152 (H)   04/07/2023 279 (H)   02/28/2022 294 (H)   03/06/2021 315 (H)     Hemoglobin A1C (%)   Date Value   02/09/2024 8.5 (H)   11/03/2023 8.2 (H)   04/07/2023 10.8 (A)   09/30/2022 10.3 (A)   02/28/2022 11.2 (A)     Bicarbonate (mmol/L)   Date Value   02/09/2024 27   11/03/2023 27   04/07/2023 29   02/28/2022 29   03/06/2021 29     Urea Nitrogen (mg/dL)   Date Value   02/09/2024 17   11/03/2023 19   04/07/2023 19   02/28/2022 19   03/06/2021 16     Creatinine (mg/dL)   Date Value   02/09/2024 0.66   11/03/2023 0.77   04/07/2023 0.81   02/28/2022 0.72   03/06/2021 0.77     Lab Results   Component Value Date    CHOL 158 11/03/2023    CHOL 186 04/07/2023    CHOL 214 (H) 02/28/2022     Lab Results   Component Value Date    HDL 45.0 11/03/2023    HDL 50.0 04/07/2023    HDL 52.0 02/28/2022     Lab Results   Component Value Date    LDLCALC 82 11/03/2023     Lab Results   Component Value Date    TRIG 153 (H) 11/03/2023    TRIG 169 (H) 04/07/2023    TRIG 167 (H) 02/28/2022     No components found for: \"CHOLHDL\"   Lab Results   Component Value Date    TSH 2.63 02/28/2022     Assessment/Plan   Mr. Valle is a 59 year old M with hx of T2DM, HTN and HLD coming for Diabetes management  Diabetes dx > 10 yrs ago  Last A1c February 2024 8.5%  Has been watching his diet trying " "to be more active  Last seen in November and Jardiance increased to 25 mg daily  current meds:  Ozempic 2 mg weekly since August  Metformin 1000 mg twice daily  Pioglitazone 45 mg daily -Ran out of refills a month ago  Glimepiride 4 mg twice daily- Ran out 2-3 months  Jardiance 25 mg once daily  Lisinopril  Pravastatin  Was having side effects from ozempic (from January-February) 24-48 hours was Acid reflux tried TUMS having nausea and vomiting /Diarrhea  BG in am: 200, BG at before dinner: 300s  Has been doing portion control  Hasn't been following with ophthalmology. Not yet  No numbness or tingling  Nephropathy on lisinopril  On pravastatin last LDL: 82  Physically active Job. [id number] steps a day     Having Nausea and Vomiting with 2 mg Ozempic  Plan:  Start Lantus 15 units at bed time.  Restart Ozempic 0.25 mg once weekly injection week 1 and week 2 if tolerated increase to 0.5 mg week 3 and 4, if tolerated increase to 1 mg weekly  Continue Jardiance 25 mg daily  Continue Metformin 1000 mg twice daily  Start Glipizide 10 mg twice daily before lunch and dinner.  Monitor Blood sugars   We ordered freestyle delfino for continuous monitoring  Continue Pravastatin and lisinopril  Continue to watch diet and stay active    Blood work   RTC in 10 weeks    Assessment & Plan  Poorly controlled type 2 diabetes mellitus (Multi)    Orders:    blood-glucose sensor (FreeStyle Delfino 3 Sensor) device; Change every 14 days    glipiZIDE (Glucotrol) 10 mg tablet; Take 1 tablet (10 mg) by mouth 2 times a day before meals.    pen needle, diabetic (BD Ultra-Fine Short Pen Needle) 31 gauge x 5/16\" needle; 1 each once daily.    semaglutide (Ozempic) 1 mg/dose (4 mg/3 mL) pen injector; Inject 1 mg under the skin 1 (one) time per week. Do not fill before August 12, 2024.    metFORMIN  mg 24 hr tablet; Take 2 tablets (1,000 mg) by mouth 2 times daily (morning and late afternoon).    empagliflozin (Jardiance) 25 mg; Take 1 tablet (25 " mg) by mouth once daily.    Renal Function Panel; Future    Hemoglobin A1C; Future    Type 2 diabetes mellitus with hyperglycemia, without long-term current use of insulin (Multi)    Orders:    empagliflozin (Jardiance) 25 mg; Take 1 tablet (25 mg) by mouth once daily.

## 2024-07-19 ENCOUNTER — APPOINTMENT (OUTPATIENT)
Dept: ENDOCRINOLOGY | Facility: HOSPITAL | Age: 59
End: 2024-07-19
Payer: COMMERCIAL

## 2024-08-07 DIAGNOSIS — F41.9 ANXIETY: ICD-10-CM

## 2024-08-07 DIAGNOSIS — E78.5 HYPERLIPIDEMIA, UNSPECIFIED HYPERLIPIDEMIA TYPE: ICD-10-CM

## 2024-08-07 DIAGNOSIS — I10 PRIMARY HYPERTENSION: ICD-10-CM

## 2024-08-07 RX ORDER — PRAVASTATIN SODIUM 20 MG/1
20 TABLET ORAL DAILY
Qty: 90 TABLET | Refills: 1 | Status: SHIPPED | OUTPATIENT
Start: 2024-08-07

## 2024-08-07 RX ORDER — LISINOPRIL 40 MG/1
40 TABLET ORAL DAILY
Qty: 90 TABLET | Refills: 1 | Status: SHIPPED | OUTPATIENT
Start: 2024-08-07

## 2024-08-07 RX ORDER — PAROXETINE 30 MG/1
30 TABLET, FILM COATED ORAL DAILY
Qty: 90 TABLET | Refills: 1 | Status: SHIPPED | OUTPATIENT
Start: 2024-08-07

## 2024-08-30 ENCOUNTER — TELEPHONE (OUTPATIENT)
Age: 59
End: 2024-08-30
Payer: COMMERCIAL

## 2024-09-06 DIAGNOSIS — E11.65 POORLY CONTROLLED TYPE 2 DIABETES MELLITUS (MULTI): ICD-10-CM

## 2024-09-08 RX ORDER — BLOOD-GLUCOSE SENSOR
EACH MISCELLANEOUS
Qty: 2 EACH | Refills: 11 | Status: SHIPPED | OUTPATIENT
Start: 2024-09-08

## 2024-09-17 ENCOUNTER — LAB (OUTPATIENT)
Dept: LAB | Facility: LAB | Age: 59
End: 2024-09-17
Payer: COMMERCIAL

## 2024-09-17 DIAGNOSIS — E11.65 POORLY CONTROLLED TYPE 2 DIABETES MELLITUS (MULTI): ICD-10-CM

## 2024-09-17 LAB
ALBUMIN SERPL BCP-MCNC: 4.3 G/DL (ref 3.4–5)
ANION GAP SERPL CALC-SCNC: 13 MMOL/L (ref 10–20)
BUN SERPL-MCNC: 20 MG/DL (ref 6–23)
CALCIUM SERPL-MCNC: 9.6 MG/DL (ref 8.6–10.3)
CHLORIDE SERPL-SCNC: 105 MMOL/L (ref 98–107)
CO2 SERPL-SCNC: 27 MMOL/L (ref 21–32)
CREAT SERPL-MCNC: 0.69 MG/DL (ref 0.5–1.3)
EGFRCR SERPLBLD CKD-EPI 2021: >90 ML/MIN/1.73M*2
GLUCOSE SERPL-MCNC: 197 MG/DL (ref 74–99)
PHOSPHATE SERPL-MCNC: 4.1 MG/DL (ref 2.5–4.9)
POTASSIUM SERPL-SCNC: 4.8 MMOL/L (ref 3.5–5.3)
SODIUM SERPL-SCNC: 140 MMOL/L (ref 136–145)

## 2024-09-17 PROCEDURE — 36415 COLL VENOUS BLD VENIPUNCTURE: CPT

## 2024-09-17 PROCEDURE — 83036 HEMOGLOBIN GLYCOSYLATED A1C: CPT

## 2024-09-17 PROCEDURE — 80069 RENAL FUNCTION PANEL: CPT

## 2024-09-18 LAB
EST. AVERAGE GLUCOSE BLD GHB EST-MCNC: 226 MG/DL
HBA1C MFR BLD: 9.5 %

## 2024-09-19 ENCOUNTER — APPOINTMENT (OUTPATIENT)
Dept: ENDOCRINOLOGY | Facility: CLINIC | Age: 59
End: 2024-09-19
Payer: COMMERCIAL

## 2024-09-19 ENCOUNTER — PHARMACY VISIT (OUTPATIENT)
Dept: PHARMACY | Facility: CLINIC | Age: 59
End: 2024-09-19
Payer: COMMERCIAL

## 2024-09-19 VITALS
DIASTOLIC BLOOD PRESSURE: 86 MMHG | TEMPERATURE: 97.1 F | WEIGHT: 206.8 LBS | SYSTOLIC BLOOD PRESSURE: 156 MMHG | HEART RATE: 56 BPM | BODY MASS INDEX: 30.54 KG/M2

## 2024-09-19 DIAGNOSIS — E78.49 OTHER HYPERLIPIDEMIA: ICD-10-CM

## 2024-09-19 DIAGNOSIS — E11.65 POORLY CONTROLLED TYPE 2 DIABETES MELLITUS (MULTI): Primary | ICD-10-CM

## 2024-09-19 PROCEDURE — 3046F HEMOGLOBIN A1C LEVEL >9.0%: CPT | Performed by: STUDENT IN AN ORGANIZED HEALTH CARE EDUCATION/TRAINING PROGRAM

## 2024-09-19 PROCEDURE — RXMED WILLOW AMBULATORY MEDICATION CHARGE

## 2024-09-19 PROCEDURE — 3077F SYST BP >= 140 MM HG: CPT | Performed by: STUDENT IN AN ORGANIZED HEALTH CARE EDUCATION/TRAINING PROGRAM

## 2024-09-19 PROCEDURE — 99214 OFFICE O/P EST MOD 30 MIN: CPT | Performed by: STUDENT IN AN ORGANIZED HEALTH CARE EDUCATION/TRAINING PROGRAM

## 2024-09-19 PROCEDURE — 4010F ACE/ARB THERAPY RXD/TAKEN: CPT | Performed by: STUDENT IN AN ORGANIZED HEALTH CARE EDUCATION/TRAINING PROGRAM

## 2024-09-19 PROCEDURE — 95251 CONT GLUC MNTR ANALYSIS I&R: CPT | Performed by: STUDENT IN AN ORGANIZED HEALTH CARE EDUCATION/TRAINING PROGRAM

## 2024-09-19 PROCEDURE — 3079F DIAST BP 80-89 MM HG: CPT | Performed by: STUDENT IN AN ORGANIZED HEALTH CARE EDUCATION/TRAINING PROGRAM

## 2024-09-19 RX ORDER — BLOOD-GLUCOSE SENSOR
EACH MISCELLANEOUS
Qty: 3 EACH | Refills: 11 | Status: SHIPPED | OUTPATIENT
Start: 2024-09-19

## 2024-09-19 RX ORDER — TIRZEPATIDE 2.5 MG/.5ML
2.5 INJECTION, SOLUTION SUBCUTANEOUS
Qty: 2 ML | Refills: 0 | Status: SHIPPED | OUTPATIENT
Start: 2024-09-19

## 2024-09-19 ASSESSMENT — PAIN SCALES - GENERAL: PAINLEVEL: 0-NO PAIN

## 2024-09-19 NOTE — PROGRESS NOTES
Patient coming in for follow up for T2DM    Subjective   Vince Valle is a 59 y.o. male who presents for follow up for Type 2 diabetes mellitus.   Lab Results   Component Value Date    HGBA1C 9.5 (H) 09/17/2024      Mr. Valle is a 59 year old M with hx of T2DM, HTN and HLD coming for Diabetes management  Diabetes dx > 10 yrs ago  Last A1c September 2024 9.5%  Has been watching his diet trying to be more active  Last seen in November and Jardiance increased to 25 mg daily  After last visit we started patient on lantus, glipizide and restarted ozempic  July restarted ozempic 0.25 1st shot was okay but with the second vomited a lot so stopped it  current meds:  Metformin 1000 mg twice daily  Pioglitazone 45 mg daily   Glipizide 10 mg twice daily  Jardiance 25 mg once daily  Lisinopril  Pravastatin    BG fluctuating depending on his work. 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 Not yet  No numbness or tingling  Nephropathy on lisinopril  On pravastatin last LDL: 82  Physically active Job. [phone] steps a day  Review of Systems  all pertinent systems reviewed and are otherwise negative   Objective   Visit Vitals  /86   Pulse 56   Temp 36.2 °C (97.1 °F)   Wt 93.8 kg (206 lb 12.8 oz)   BMI 30.54 kg/m²   Smoking Status Former   BSA 2.14 m²      Physical Exam  Constitutional:       General: He is not in acute distress.     Appearance: Normal appearance. He is obese.   Eyes:      Extraocular Movements: Extraocular movements intact.      Pupils: Pupils are equal, round, and reactive to light.   Cardiovascular:      Rate and Rhythm: Normal rate and regular rhythm.   Pulmonary:      Effort: Pulmonary effort is normal. No respiratory distress.      Breath sounds: Normal breath sounds.   Abdominal:      General: Bowel sounds are normal.      Palpations: Abdomen is soft.      Tenderness: There is no abdominal tenderness.  "  Skin:     Coloration: Skin is not jaundiced or pale.      Findings: No erythema or rash.   Neurological:      General: No focal deficit present.      Mental Status: He is alert and oriented to person, place, and time.      Deep Tendon Reflexes: Reflexes normal.   Psychiatric:         Mood and Affect: Mood normal.         Behavior: Behavior normal.         Lab Review  Glucose (mg/dL)   Date Value   09/17/2024 197 (H)   02/09/2024 153 (H)   11/03/2023 152 (H)     Hemoglobin A1C (%)   Date Value   09/17/2024 9.5 (H)   02/09/2024 8.5 (H)   11/03/2023 8.2 (H)     Bicarbonate (mmol/L)   Date Value   09/17/2024 27   02/09/2024 27   11/03/2023 27     Urea Nitrogen (mg/dL)   Date Value   09/17/2024 20   02/09/2024 17   11/03/2023 19     Creatinine (mg/dL)   Date Value   09/17/2024 0.69   02/09/2024 0.66   11/03/2023 0.77     Lab Results   Component Value Date    CHOL 158 11/03/2023    CHOL 186 04/07/2023    CHOL 214 (H) 02/28/2022     Lab Results   Component Value Date    HDL 45.0 11/03/2023    HDL 50.0 04/07/2023    HDL 52.0 02/28/2022     Lab Results   Component Value Date    LDLCALC 82 11/03/2023     Lab Results   Component Value Date    TRIG 153 (H) 11/03/2023    TRIG 169 (H) 04/07/2023    TRIG 167 (H) 02/28/2022     No components found for: \"CHOLHDL\"   Lab Results   Component Value Date    TSH 2.63 02/28/2022     No results found for: \"ALBUR\", \"MAK16ZCG\"     Health Maintenance:       Assessment/Plan   Mr. Valle is a 59 year old M with hx of T2DM, HTN and HLD coming for Diabetes management  Diabetes dx > 10 yrs ago  Last A1c September 2024 9.5%  Has been watching his diet trying to be more active  Last seen in November and Jardiance increased to 25 mg daily  After last visit we started patient on lantus, glipizide and restarted ozempic  July restarted ozempic 0.25 1st shot was okay but with the second vomited a lot so stopped it  current meds:  Metformin 1000 mg twice daily  Pioglitazone 45 mg daily   Glipizide 10 mg " twice daily  Jardiance 25 mg once daily  Lisinopril  Pravastatin  AVG B \  TIR: 52%  BG fluctuating depending on his work. 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 Not yet  No numbness or tingling  Nephropathy on lisinopril  On pravastatin last LDL: 82  Physically active Job. [phone] steps a day  Plan:  Continue Lantus 15 units at bed time.  Start Mounjaro 2.5 mg weekly.  If you are tolerating Mounjaro please let me know so we can reorder it and we will consider going up on the dose depending on your sugars at that time.  Continue Jardiance 25 mg daily  Continue Metformin 1000 mg twice daily  Glipizide 10 mg twice daily before breakfast and dinner.  Monitor Blood sugars   We ordered freestyle delfino for continuous monitoring.   Continue Pravastatin and lisinopril  Continue to watch diet and stay active  Follow with ophthlmology     Blood work before next apt  RTC in 3 months  Assessment & Plan  Poorly controlled type 2 diabetes mellitus (Multi)    Orders:    blood-glucose sensor (FreeStyle Delfino 3 Plus Sensor) device; Check blood sugar 3-4 times daily and change sensor every 15 days    tirzepatide (Mounjaro) 2.5 mg/0.5 mL pen injector; Inject 2.5 mg under the skin every 7 days.    Hemoglobin A1C; Future    Lipid Panel; Future    Renal Function Panel; Future    Albumin-Creatinine Ratio, Urine Random; Future    Other hyperlipidemia    Orders:    Lipid Panel; Future        .

## 2024-09-19 NOTE — PATIENT INSTRUCTIONS
Continue Lantus 15 units at bed time.  Start Mounjaro 2.5 mg weekly.  If you are tolerating Mounjaro please let me know so we can reorder it and we will consider going up on the dose depending on your sugars at that time.  Continue Jardiance 25 mg daily  Continue Metformin 1000 mg twice daily  Glipizide 10 mg twice daily before breakfast and dinner.  Monitor Blood sugars   We ordered freestyle kareem for continuous monitoring.  Please check downstairs if they have it   Continue Pravastatin and lisinopril  Continue to watch diet and stay active  Follow with ophthlmology     Blood work before next apt  RTC in 3 months

## 2024-10-18 ENCOUNTER — APPOINTMENT (OUTPATIENT)
Dept: PRIMARY CARE | Facility: CLINIC | Age: 59
End: 2024-10-18
Payer: COMMERCIAL

## 2024-10-18 VITALS
DIASTOLIC BLOOD PRESSURE: 68 MMHG | WEIGHT: 203 LBS | BODY MASS INDEX: 30.07 KG/M2 | SYSTOLIC BLOOD PRESSURE: 130 MMHG | HEIGHT: 69 IN | HEART RATE: 68 BPM

## 2024-10-18 DIAGNOSIS — E11.65 TYPE 2 DIABETES MELLITUS WITH HYPERGLYCEMIA, WITHOUT LONG-TERM CURRENT USE OF INSULIN: ICD-10-CM

## 2024-10-18 DIAGNOSIS — I10 PRIMARY HYPERTENSION: ICD-10-CM

## 2024-10-18 DIAGNOSIS — Z23 NEED FOR INFLUENZA VACCINATION: ICD-10-CM

## 2024-10-18 DIAGNOSIS — F41.9 ANXIETY: ICD-10-CM

## 2024-10-18 DIAGNOSIS — Z23 NEED FOR PNEUMOCOCCAL 20-VALENT CONJUGATE VACCINATION: ICD-10-CM

## 2024-10-18 DIAGNOSIS — E78.49 OTHER HYPERLIPIDEMIA: ICD-10-CM

## 2024-10-18 DIAGNOSIS — Z12.11 ENCOUNTER FOR SCREENING COLONOSCOPY: ICD-10-CM

## 2024-10-18 DIAGNOSIS — Z00.00 WELLNESS EXAMINATION: Primary | ICD-10-CM

## 2024-10-18 PROBLEM — Z91.199 GENERAL PATIENT NONCOMPLIANCE: Status: RESOLVED | Noted: 2023-04-13 | Resolved: 2024-10-18

## 2024-10-18 PROCEDURE — 99396 PREV VISIT EST AGE 40-64: CPT | Performed by: FAMILY MEDICINE

## 2024-10-18 PROCEDURE — 4010F ACE/ARB THERAPY RXD/TAKEN: CPT | Performed by: FAMILY MEDICINE

## 2024-10-18 PROCEDURE — 3046F HEMOGLOBIN A1C LEVEL >9.0%: CPT | Performed by: FAMILY MEDICINE

## 2024-10-18 PROCEDURE — 3008F BODY MASS INDEX DOCD: CPT | Performed by: FAMILY MEDICINE

## 2024-10-18 PROCEDURE — 90677 PCV20 VACCINE IM: CPT | Performed by: FAMILY MEDICINE

## 2024-10-18 PROCEDURE — 3078F DIAST BP <80 MM HG: CPT | Performed by: FAMILY MEDICINE

## 2024-10-18 PROCEDURE — 1036F TOBACCO NON-USER: CPT | Performed by: FAMILY MEDICINE

## 2024-10-18 PROCEDURE — 3075F SYST BP GE 130 - 139MM HG: CPT | Performed by: FAMILY MEDICINE

## 2024-10-18 PROCEDURE — 90471 IMMUNIZATION ADMIN: CPT | Performed by: FAMILY MEDICINE

## 2024-10-18 PROCEDURE — 90673 RIV3 VACCINE NO PRESERV IM: CPT | Performed by: FAMILY MEDICINE

## 2024-10-18 PROCEDURE — 90472 IMMUNIZATION ADMIN EACH ADD: CPT | Performed by: FAMILY MEDICINE

## 2024-10-18 NOTE — PROGRESS NOTES
Wellness visit    Subjective  Vince Valle is a 59 y.o. male who presents for Annual Exam.  HPI  Candidate for influenza vaccine and Prevnar 20.  Agrees to both.  Colonoscopy was 11/5/2014.  Grandfather had colon cancer.  No personal history of polyps, no first degree relative history.  Discussed options, chooses colonoscopy.  Off work on Fridays if can be arranged or will need to be after the first of the year when he can use vacation.   Dm sees endocrine, recently switched from ozempic to mounjaro, most recent A1c 9.5  Anxiety, stable on current regimen, states most of current issues are work related and doesn't think adjusting the medicine is necessary at this time  HTN, hyperlipidemia, stable on current regimen  Review of Systems   All other systems reviewed and are negative.  .    Allergies   Allergen Reactions    Cat Dander Unknown    House Dust Unknown    Latex Itching       Current Outpatient Medications on File Prior to Visit   Medication Sig Dispense Refill    blood-glucose sensor (FreeStyle Delfino 3 Plus Sensor) device Check blood sugar 3-4 times daily and change sensor every 15 days 3 each 11    cholecalciferol (Vitamin D-3) 25 MCG (1000 UT) capsule Take 1 capsule (25 mcg) by mouth.      empagliflozin (Jardiance) 25 mg Take 1 tablet (25 mg) by mouth once daily. 30 tablet 11    glipiZIDE (Glucotrol) 10 mg tablet Take 1 tablet (10 mg) by mouth 2 times a day before meals. 180 tablet 3    insulin glargine-yfgn (Semglee,insulin glarg-yfgn,Pen) 100 unit/mL (3 mL) Pen Inject 15 Units under the skin once every 24 hours. Take as directed per insulin instructions. 15 mL 3    lisinopril 40 mg tablet Take 1 tablet (40 mg) by mouth once daily. 90 tablet 1    metFORMIN  mg 24 hr tablet Take 2 tablets (1,000 mg) by mouth 2 times daily (morning and late afternoon). 120 tablet 3    multivitamin tablet Take 1 tablet by mouth once daily.      PARoxetine (Paxil) 30 mg tablet Take 1 tablet (30 mg) by mouth once daily.  "90 tablet 1    pen needle, diabetic (BD Ultra-Fine Short Pen Needle) 31 gauge x 5/16\" needle 1 each once daily. 90 each 3    pravastatin (Pravachol) 20 mg tablet Take 1 tablet (20 mg) by mouth once daily. 90 tablet 1    tirzepatide (Mounjaro) 2.5 mg/0.5 mL pen injector Inject 2.5 mg under the skin every 7 days. 2 mL 0    [DISCONTINUED] semaglutide (Ozempic) 0.25 mg or 0.5 mg (2 mg/3 mL) pen injector Inject 0.5 mg under the skin every 7 days. Inject 0.25 mg weekly for 4 weeks then 0.5 mg weekly for 4 weeks if tolerated then go to 1 mg weekly 3 mL 3    [DISCONTINUED] semaglutide (Ozempic) 1 mg/dose (4 mg/3 mL) pen injector Inject 1 mg under the skin 1 (one) time per week. Do not fill before August 12, 2024. 3 mL 3     No current facility-administered medications on file prior to visit.       Patient Active Problem List   Diagnosis    Allergic rhinitis    Anxiety    Hyperlipidemia    Hypertension    Type 2 diabetes mellitus with hyperglycemia, without long-term current use of insulin    Vitamin D deficiency    Wellness examination       Objective     Visit Vitals  /68 (BP Location: Right arm, Patient Position: Sitting)   Pulse 68      Physical Exam  Vitals and nursing note reviewed.   Constitutional:       General: He is not in acute distress.     Appearance: Normal appearance. He is not toxic-appearing.   HENT:      Head: Normocephalic and atraumatic.   Cardiovascular:      Rate and Rhythm: Normal rate and regular rhythm.      Heart sounds: No murmur heard.  Pulmonary:      Effort: Pulmonary effort is normal.      Breath sounds: Normal breath sounds.   Musculoskeletal:      Cervical back: Neck supple. No rigidity.      Comments:     Skin:     General: Skin is warm and dry.   Neurological:      General: No focal deficit present.      Mental Status: He is alert and oriented to person, place, and time.   Psychiatric:         Mood and Affect: Mood normal.         Behavior: Behavior normal.       Lab on 09/17/2024 "   Component Date Value Ref Range Status    Glucose 09/17/2024 197 (H)  74 - 99 mg/dL Final    Sodium 09/17/2024 140  136 - 145 mmol/L Final    Potassium 09/17/2024 4.8  3.5 - 5.3 mmol/L Final    Chloride 09/17/2024 105  98 - 107 mmol/L Final    Bicarbonate 09/17/2024 27  21 - 32 mmol/L Final    Anion Gap 09/17/2024 13  10 - 20 mmol/L Final    Urea Nitrogen 09/17/2024 20  6 - 23 mg/dL Final    Creatinine 09/17/2024 0.69  0.50 - 1.30 mg/dL Final    eGFR 09/17/2024 >90  >60 mL/min/1.73m*2 Final    Calculations of estimated GFR are performed using the 2021 CKD-EPI Study Refit equation without the race variable for the IDMS-Traceable creatinine methods.  https://jasn.asnjournals.org/content/early/2021/09/22/ASN.0552405456    Calcium 09/17/2024 9.6  8.6 - 10.3 mg/dL Final    Phosphorus 09/17/2024 4.1  2.5 - 4.9 mg/dL Final    The performance characteristics of phosphorus testing in heparinized plasma have been validated by the individual  laboratory site where testing is performed. Testing on heparinized plasma is not approved by the FDA; however, such approval is not necessary.    Albumin 09/17/2024 4.3  3.4 - 5.0 g/dL Final    Hemoglobin A1C 09/17/2024 9.5 (H)  See comment % Final    Estimated Average Glucose 09/17/2024 226  Not Established mg/dL Final       Assessment/Plan   Problem List Items Addressed This Visit       Anxiety    Hyperlipidemia    Hypertension    Type 2 diabetes mellitus with hyperglycemia, without long-term current use of insulin    Wellness examination - Primary     Other Visit Diagnoses       Need for influenza vaccination        Relevant Orders    Flu vaccine, trivalent, preservative free, no egg protein, age 18y+ (Flublok)    Need for pneumococcal 20-valent conjugate vaccination        Relevant Orders    Pneumococcal conjugate vaccine, 20-valent (PREVNAR 20)    Encounter for screening colonoscopy        Relevant Orders    Colonoscopy Screening; Average Risk Patient             Routine follow up  annually.  Call concerns.        Rosalind Reyes MD

## 2024-11-20 DIAGNOSIS — E11.65 POORLY CONTROLLED TYPE 2 DIABETES MELLITUS (MULTI): ICD-10-CM

## 2024-11-21 RX ORDER — TIRZEPATIDE 2.5 MG/.5ML
2.5 INJECTION, SOLUTION SUBCUTANEOUS
Qty: 2 ML | Refills: 3 | Status: SHIPPED | OUTPATIENT
Start: 2024-11-21

## 2024-12-06 ENCOUNTER — LAB (OUTPATIENT)
Dept: LAB | Facility: LAB | Age: 59
End: 2024-12-06
Payer: COMMERCIAL

## 2024-12-06 DIAGNOSIS — E11.65 POORLY CONTROLLED TYPE 2 DIABETES MELLITUS (MULTI): ICD-10-CM

## 2024-12-06 DIAGNOSIS — E78.49 OTHER HYPERLIPIDEMIA: ICD-10-CM

## 2024-12-06 LAB
ALBUMIN SERPL BCP-MCNC: 4.2 G/DL (ref 3.4–5)
ANION GAP SERPL CALC-SCNC: 11 MMOL/L (ref 10–20)
BUN SERPL-MCNC: 16 MG/DL (ref 6–23)
CALCIUM SERPL-MCNC: 9.1 MG/DL (ref 8.6–10.3)
CHLORIDE SERPL-SCNC: 105 MMOL/L (ref 98–107)
CHOLEST SERPL-MCNC: 192 MG/DL (ref 0–199)
CHOLESTEROL/HDL RATIO: 3.5
CO2 SERPL-SCNC: 27 MMOL/L (ref 21–32)
CREAT SERPL-MCNC: 0.69 MG/DL (ref 0.5–1.3)
CREAT UR-MCNC: 46.2 MG/DL (ref 20–370)
EGFRCR SERPLBLD CKD-EPI 2021: >90 ML/MIN/1.73M*2
EST. AVERAGE GLUCOSE BLD GHB EST-MCNC: 197 MG/DL
GLUCOSE SERPL-MCNC: 217 MG/DL (ref 74–99)
HBA1C MFR BLD: 8.5 %
HDLC SERPL-MCNC: 55 MG/DL
LDLC SERPL CALC-MCNC: 117 MG/DL
MICROALBUMIN UR-MCNC: 38.2 MG/L
MICROALBUMIN/CREAT UR: 82.7 UG/MG CREAT
NON HDL CHOLESTEROL: 137 MG/DL (ref 0–149)
PHOSPHATE SERPL-MCNC: 3.9 MG/DL (ref 2.5–4.9)
POTASSIUM SERPL-SCNC: 4.3 MMOL/L (ref 3.5–5.3)
SODIUM SERPL-SCNC: 139 MMOL/L (ref 136–145)
TRIGL SERPL-MCNC: 100 MG/DL (ref 0–149)
VLDL: 20 MG/DL (ref 0–40)

## 2024-12-06 PROCEDURE — 82043 UR ALBUMIN QUANTITATIVE: CPT

## 2024-12-06 PROCEDURE — 83036 HEMOGLOBIN GLYCOSYLATED A1C: CPT

## 2024-12-06 PROCEDURE — 36415 COLL VENOUS BLD VENIPUNCTURE: CPT

## 2024-12-06 PROCEDURE — 82570 ASSAY OF URINE CREATININE: CPT

## 2024-12-06 PROCEDURE — 80061 LIPID PANEL: CPT

## 2024-12-06 PROCEDURE — 80069 RENAL FUNCTION PANEL: CPT

## 2024-12-12 ENCOUNTER — APPOINTMENT (OUTPATIENT)
Dept: ENDOCRINOLOGY | Facility: CLINIC | Age: 59
End: 2024-12-12
Payer: COMMERCIAL

## 2024-12-12 VITALS
TEMPERATURE: 97.3 F | HEIGHT: 69 IN | WEIGHT: 203 LBS | BODY MASS INDEX: 30.07 KG/M2 | HEART RATE: 66 BPM | SYSTOLIC BLOOD PRESSURE: 164 MMHG | DIASTOLIC BLOOD PRESSURE: 81 MMHG

## 2024-12-12 DIAGNOSIS — E11.65 POORLY CONTROLLED TYPE 2 DIABETES MELLITUS (MULTI): Primary | ICD-10-CM

## 2024-12-12 DIAGNOSIS — I10 PRIMARY HYPERTENSION: ICD-10-CM

## 2024-12-12 DIAGNOSIS — E78.49 OTHER HYPERLIPIDEMIA: ICD-10-CM

## 2024-12-12 PROCEDURE — 3060F POS MICROALBUMINURIA REV: CPT | Performed by: STUDENT IN AN ORGANIZED HEALTH CARE EDUCATION/TRAINING PROGRAM

## 2024-12-12 PROCEDURE — 3079F DIAST BP 80-89 MM HG: CPT | Performed by: STUDENT IN AN ORGANIZED HEALTH CARE EDUCATION/TRAINING PROGRAM

## 2024-12-12 PROCEDURE — 99215 OFFICE O/P EST HI 40 MIN: CPT | Performed by: STUDENT IN AN ORGANIZED HEALTH CARE EDUCATION/TRAINING PROGRAM

## 2024-12-12 PROCEDURE — 3049F LDL-C 100-129 MG/DL: CPT | Performed by: STUDENT IN AN ORGANIZED HEALTH CARE EDUCATION/TRAINING PROGRAM

## 2024-12-12 PROCEDURE — 3077F SYST BP >= 140 MM HG: CPT | Performed by: STUDENT IN AN ORGANIZED HEALTH CARE EDUCATION/TRAINING PROGRAM

## 2024-12-12 PROCEDURE — 3008F BODY MASS INDEX DOCD: CPT | Performed by: STUDENT IN AN ORGANIZED HEALTH CARE EDUCATION/TRAINING PROGRAM

## 2024-12-12 PROCEDURE — 4010F ACE/ARB THERAPY RXD/TAKEN: CPT | Performed by: STUDENT IN AN ORGANIZED HEALTH CARE EDUCATION/TRAINING PROGRAM

## 2024-12-12 PROCEDURE — 95251 CONT GLUC MNTR ANALYSIS I&R: CPT | Performed by: STUDENT IN AN ORGANIZED HEALTH CARE EDUCATION/TRAINING PROGRAM

## 2024-12-12 PROCEDURE — 3052F HG A1C>EQUAL 8.0%<EQUAL 9.0%: CPT | Performed by: STUDENT IN AN ORGANIZED HEALTH CARE EDUCATION/TRAINING PROGRAM

## 2024-12-12 RX ORDER — ATORVASTATIN CALCIUM 40 MG/1
40 TABLET, FILM COATED ORAL DAILY
Qty: 30 TABLET | Refills: 11 | Status: SHIPPED | OUTPATIENT
Start: 2024-12-12 | End: 2025-12-12

## 2024-12-12 RX ORDER — ACETAMINOPHEN 500 MG
TABLET ORAL
Qty: 1 KIT | Refills: 0 | Status: SHIPPED | OUTPATIENT
Start: 2024-12-12

## 2024-12-12 RX ORDER — TIRZEPATIDE 5 MG/.5ML
5 INJECTION, SOLUTION SUBCUTANEOUS
Qty: 2 ML | Refills: 3 | Status: SHIPPED | OUTPATIENT
Start: 2024-12-12

## 2024-12-12 RX ORDER — INSULIN LISPRO-AABC 100 [IU]/ML
INJECTION, SOLUTION INTRAVENOUS; SUBCUTANEOUS
Qty: 15 ML | Refills: 3 | Status: SHIPPED | OUTPATIENT
Start: 2024-12-12

## 2024-12-12 NOTE — PROGRESS NOTES
"Patient coming in for follow up for T2DM    Subjective   Vince Valle is a 59 y.o. male who presents for follow up for Type 2 diabetes mellitus.   Lab Results   Component Value Date    HGBA1C 8.5 (H) 12/06/2024      Mr. Valle is a 59 year old M with hx of T2DM, HTN and HLD coming for Diabetes management  Diabetes dx > 10 yrs ago  Last A1c September 2024 9.5%  Has been watching his diet trying to be more active  Last seen in November and Jardiance increased to 25 mg daily  After last visit we started patient on lantus, glipizide and restarted ozempic  July restarted ozempic 0.25 1st shot was okay but with the second vomited a lot so stopped it  Started to mounjaro last visit did not have any side effects  Lost some weight initially on it.  current meds:  Metformin 1000 mg twice daily  Pioglitazone 45 mg daily stopped  Glipizide 10 mg twice daily  Jardiance 25 mg once daily  Lantus 15 units  Lisinopril  Pravastatin  Today BG in 400s. Had a couple breakfast burritos and a diet coke        Has been eating bad turkey dressing mashed potatoes  Stuart helped with the appetite  Has been eating some sean cookies  Hasn't been following with ophthalmology. Not yet. Scheduled in January  No numbness or tingling  Nephropathy on lisinopril  On pravastatin last LDL: 117  Physically active Job. [id number] steps a day  I  Review of Systems  all pertinent systems reviewed and are otherwise negative   Objective   /81 (BP Location: Right arm, Patient Position: Sitting, BP Cuff Size: Adult)   Pulse 66   Temp 36.3 °C (97.3 °F)   Ht 1.753 m (5' 9\")   Wt 92.1 kg (203 lb)   BMI 29.98 kg/m²   Physical Exam  Constitutional:       General: He is not in acute distress.     Appearance: Normal appearance.   Eyes:      Extraocular Movements: Extraocular movements intact.      Pupils: Pupils are equal, round, and reactive to light.   Cardiovascular:      Rate and Rhythm: Normal rate and regular rhythm.   Pulmonary:      Effort: " "Pulmonary effort is normal. No respiratory distress.      Breath sounds: Normal breath sounds.   Abdominal:      General: Bowel sounds are normal.      Palpations: Abdomen is soft.      Tenderness: There is no abdominal tenderness.   Skin:     Coloration: Skin is not jaundiced or pale.      Findings: No erythema or rash.   Neurological:      General: No focal deficit present.      Mental Status: He is alert and oriented to person, place, and time.      Deep Tendon Reflexes: Reflexes normal.   Psychiatric:         Mood and Affect: Mood normal.         Behavior: Behavior normal.         Lab Review  Glucose (mg/dL)   Date Value   12/06/2024 217 (H)   09/17/2024 197 (H)   02/09/2024 153 (H)     Hemoglobin A1C (%)   Date Value   12/06/2024 8.5 (H)   09/17/2024 9.5 (H)   02/09/2024 8.5 (H)     Bicarbonate (mmol/L)   Date Value   12/06/2024 27   09/17/2024 27   02/09/2024 27     Urea Nitrogen (mg/dL)   Date Value   12/06/2024 16   09/17/2024 20   02/09/2024 17     Creatinine (mg/dL)   Date Value   12/06/2024 0.69   09/17/2024 0.69   02/09/2024 0.66     Lab Results   Component Value Date    CHOL 192 12/06/2024    CHOL 158 11/03/2023    CHOL 186 04/07/2023     Lab Results   Component Value Date    HDL 55.0 12/06/2024    HDL 45.0 11/03/2023    HDL 50.0 04/07/2023     Lab Results   Component Value Date    LDLCALC 117 (H) 12/06/2024    LDLCALC 82 11/03/2023     Lab Results   Component Value Date    TRIG 100 12/06/2024    TRIG 153 (H) 11/03/2023    TRIG 169 (H) 04/07/2023     No components found for: \"CHOLHDL\"   Lab Results   Component Value Date    TSH 2.63 02/28/2022     No results found for: \"ALBUR\", \"[id number]\"     Health Maintenance:       Assessment/Plan   Mr. Valle is a 59 year old M with hx of T2DM, HTN and HLD coming for Diabetes management  Diabetes dx > 10 yrs ago  Last A1c September 2024 9.5%  Has been watching his diet trying to be more active  Last seen in November and Jardiance increased to 25 mg daily  After last " visit we started patient on lantus, glipizide and restarted ozempic  July restarted ozempic 0.25 1st shot was okay but with the second vomited a lot so stopped it  Started to mounjaro last visit did not have any side effects  Lost some weight initially on it.  current meds:  Metformin 1000 mg twice daily  Pioglitazone 45 mg daily stopped  Glipizide 10 mg twice daily  Jardiance 25 mg once daily  Lantus 15 units  Lisinopril  Pravastatin  Today BG in 400s. Had a couple breakfast burritos and a diet coke  BG [id number]  TIR: 15%  Mounjaro helped with the appetite  Has been eating some sean cookies  Hasn't been following with ophthalmology. Not yet. Scheduled in January  No numbness or tingling  Nephropathy on lisinopril  On pravastatin last LDL: 117  Physically active Job. [phone] steps a day  Plan:  Continue Lantus 15 units at bed time.  Start Mounjaro 2.5 mg weekly.  If you are tolerating Mounjaro increase to 5 mg in 1 month (ordered) If you are having any issues getting it let me know  Continue Jardiance 25 mg daily  Continue Metformin 1000 mg twice daily  Stop Glipizide   Start Lyumjev 3 units before each meal 10 min before. If in 1 week BG remains elevated after meals increase to 5 (if goes above 250)   Monitor Blood sugars   Stop pravastatin  Start Atorvastatin 40 mg daily  Continue lisinopril  Start watching diet and stay active  Follow with ophthlmology     Today when you get home if still high take 3 units   Clinical pharmacy follow up in 2 weeks  Blood work before next apt  RTC in 4 months  Assessment & Plan  Poorly controlled type 2 diabetes mellitus (Multi)    Orders:    tirzepatide (Mounjaro) 5 mg/0.5 mL pen injector; Inject 5 mg under the skin every 7 days.    insulin lispro-aabc (Lyumjev U-100 Insulin) 100 unit/mL solution; Take as directed per insulin instructions. Take before large meals take 5 units breakfast lunch and dinner    Referral to Clinical Pharmacy; Future    Lipid Panel; Future     Hemoglobin A1C; Future    Renal Function Panel; Future    Primary hypertension    Orders:    blood pressure monitor kit; Check BP  daily    Other hyperlipidemia    Orders:    atorvastatin (Lipitor) 40 mg tablet; Take 1 tablet (40 mg) by mouth once daily.

## 2024-12-12 NOTE — PATIENT INSTRUCTIONS
Continue Lantus 15 units at bed time.  Start Mounjaro 2.5 mg weekly.  If you are tolerating Mounjaro increase to 5 mg in 1 month (ordered) If you are having any issues getting it let me know  Continue Jardiance 25 mg daily  Continue Metformin 1000 mg twice daily  Stop Glipizide   Start Lyumjev 3 units before each meal 10 min before. If in 1 week BG remains elevated after meals increase to 5 (if goes above 250)   Monitor Blood sugars   Stop pravastatin  Start Atorvastatin 40 mg daily  Continue lisinopril  Start watching diet and stay active  Follow with ophthlmology     Today when you get home if still high take 3 units   Clinical pharmacy follow up in 2 weeks  Blood work before next apt  RTC in 4 months

## 2024-12-27 NOTE — PROGRESS NOTES
Patient is sent at the request of Jesús Mendoza MD for my opinion regarding diabetes.  My recommendations below will be communicated back to the requesting provider by way of shared medical record.    Recommendations:   Increase Lyumjev from 3 --> 5 units before meals  Decrease Mounjaro from 5 --> 2.5mg once weekly  Continue all other medications  ________________________________________________________________________    Subjective   Past Medical History:  Patient Active Problem List   Diagnosis    Allergic rhinitis    Anxiety    Hyperlipidemia    Hypertension    Type 2 diabetes mellitus with hyperglycemia, without long-term current use of insulin    Vitamin D deficiency    Wellness examination     Interim:  Vince Valle is a 59 y.o. male presents today for new patient visit with endo PharmD for Type 2 Diabetes Mellitus. Last seen by Jesús Mendoza on 12/12/24 where Mounjaro increased from 2.5 --> 5mg weekly, glipizide stopped, and Lyumkev started at 3 units with meals.    Today the patient reports that he took one dose of Mounjaro 5mg and had intolerable side effects simlar to the effects he had on Ozempic, and then self-decreased back to 2.5mg this past week and his ADRs resolved. Reports compliance with Lyumjev, denies acute concerns.    Diabetes Pharmacotherapy:    Mounjaro 2.5mg weekly (Saturday)  Had one dose of 5mg, had significant diarrhea, vomiting, and GI upset  Reduced back down last week and has not had any ADRs  Metformin 1000 mg twice daily  Jardiance 25 mg once daily  Lantus 15 units daily  Lyumjev 3 units TID AC  Has 2-3x/day  Takes 5-10 minutes before meals    Adherence: reports missing 1 dose per week    Previously trialed meds:   Ozempic - vomitting  Glipizide - stopped when Lyumjev  Pioglitazone    Social:  Current diet: on average, 2-3 meals per day  Breakfast - usually light; rice cake  Lunch - vending machine at work; cheese chunks, beef jerky, or pretzels  Dinner - ham, scallop  "potatoes, macaroni and cheese  Snack - not normally, some sean sweets recently  Fluids - coffee (creamer), tea, diet pop  Exercise: Physically active Job. [phone] steps a day       Allergies:  Cat dander, House dust, and Latex    Medication list:  Current Outpatient Medications   Medication Instructions    atorvastatin (LIPITOR) 40 mg, oral, Daily    blood pressure monitor kit Check BP  daily    blood-glucose sensor (FreeStyle Delfino 3 Plus Sensor) device Check blood sugar 3-4 times daily and change sensor every 15 days    cholecalciferol (VITAMIN D-3) 25 mcg    empagliflozin (JARDIANCE) 25 mg, oral, Daily    glipiZIDE (GLUCOTROL) 10 mg, oral, 2 times daily before meals    insulin glargine-yfgn (SEMGLEE(INSULIN GLARG-YFGN)PEN) 15 Units, subcutaneous, Every 24 hours, Take as directed per insulin instructions.    insulin lispro-aabc (Lyumjev U-100 Insulin) 100 unit/mL solution Take as directed per insulin instructions. Take before large meals take 5 units breakfast lunch and dinner    lisinopril 40 mg, oral, Daily    metFORMIN  mg 24 hr tablet Take 2 tablets (1,000 mg) by mouth 2 times daily (morning and late afternoon).    Mounjaro 5 mg, subcutaneous, Every 7 days    multivitamin tablet 1 tablet, Daily    PARoxetine (PAXIL) 30 mg, oral, Daily    pen needle, diabetic (BD Ultra-Fine Short Pen Needle) 31 gauge x 5/16\" needle 1 each, miscellaneous, Daily    pravastatin (PRAVACHOL) 20 mg, oral, Daily        Objective   Last Recorded Vitals:  BP Readings from Last 3 Encounters:   12/12/24 164/81   10/18/24 130/68   09/19/24 156/86     Wt Readings from Last 3 Encounters:   12/12/24 92.1 kg (203 lb)   10/18/24 92.1 kg (203 lb)   09/19/24 93.8 kg (206 lb 12.8 oz)     BMI Readings from Last 1 Encounters:   12/12/24 29.98 kg/m²      Labs  A1C  Lab Results   Component Value Date    HGBA1C 8.5 (H) 12/06/2024    HGBA1C 9.5 (H) 09/17/2024    HGBA1C 8.5 (H) 02/09/2024     BMP/LFTs  Lab Results   Component Value Date    " CREATININE 0.69 12/06/2024    CREATININE 0.69 09/17/2024    CREATININE 0.66 02/09/2024    EGFR >90 12/06/2024    EGFR >90 09/17/2024    EGFR >90 02/09/2024    GLUCOSE 217 (H) 12/06/2024     12/06/2024    K 4.3 12/06/2024     12/06/2024    CALCIUM 9.1 12/06/2024    CO2 27 12/06/2024    BUN 16 12/06/2024    ALT 22 04/07/2023    AST 17 04/07/2023    ALKPHOS 42 04/07/2023    BILITOT 0.5 04/07/2023     Lipids  Lab Results   Component Value Date    TRIG 100 12/06/2024    CHOL 192 12/06/2024    LDLF 102 (H) 04/07/2023    LDLCALC 117 (H) 12/06/2024    HDL 55.0 12/06/2024     Urine Albumin Creatinine Ratio  Lab Results   Component Value Date    MICROALBCREA 82.7 (H) 12/06/2024    MICROALBCREA 34.8 (H) 11/03/2023    MICROALBCREA 63.0 (H) 03/06/2021     ASCVD risk  The 10-year ASCVD risk score (Alexis GONZALEZ, et al., 2019) is: 24.9%    Values used to calculate the score:      Age: 59 years      Sex: Male      Is Non- : No      Diabetic: Yes      Tobacco smoker: No      Systolic Blood Pressure: 164 mmHg      Is BP treated: Yes      HDL Cholesterol: 55 mg/dL      Total Cholesterol: 192 mg/dL    Additional labs:  Lab Results   Component Value Date    BBX14YZ 0.00 09/30/2022       Home glucose monitoring:  Hypoglycemia: denies readings <70 mg/dL or s/sx of hypoglycemia        Assessment/Plan   Type 2 Diabetes Mellitus  Goal A1C <6.5-7%, above goal as of 12/2024. GMI and TIR not at goal, no hypoglycemia noted. Pt did not tolerate increased dose of Mounjaro, had similar reactions to Ozempic as well. For now will continue lower dose Mounjaro as pt tolerated this dose well. Pt reports compliance with newly started Lyumjev, however is still having post prandial spikes. Will increase Lyumjev today and see patient back in 1 month.  Plan:  Increase Lyumjev from 3 --> 5 units before meals  Decrease Mounjaro from 5 --> 2.5mg once weekly  Continue all other medications  Home glucose monitoring:   Will continue  FSL3+  A minimum of 72 hours of CGM data was reviewed and used to make therapy changes.   Education Provided to Patient:   Glycemic goals   Hypertension:   Goal BP <130/80  Above goal in clinic last 2/3 checks  Unable to assess today, denies acute concerns  Will discuss at next visit  Primary prevention:   Therapy: High intensity statin , switched from pravastatin 20mg to atorvastatin 40mg on 12/12 by endo  LDL result does not yet meet goal (12/2024)  Due for repeat lipid panel in 2-3 months  Renal:  CKD: stage 1 - normal function  ACR:  (12/2024)  Renal protective agents: ACEi/ARB, SGLT2i, and GLP1  DM medications are dosed appropriately for renal function  Labs: up to date   PharmD follow-up: 1 month  Endo follow-up: 4/10/25    Patient agreeable to plan as above, contact information provided for any future questions or concerns.    Lan Diehl, PharmD    Type of encounter: virtual    Continue all meds under the continuation of care with the referring provider and clinical pharmacy team.

## 2024-12-31 ENCOUNTER — APPOINTMENT (OUTPATIENT)
Dept: PHARMACY | Facility: HOSPITAL | Age: 59
End: 2024-12-31
Payer: COMMERCIAL

## 2024-12-31 VITALS — WEIGHT: 203 LBS | HEIGHT: 69 IN | BODY MASS INDEX: 30.07 KG/M2

## 2024-12-31 DIAGNOSIS — E11.65 POORLY CONTROLLED TYPE 2 DIABETES MELLITUS (MULTI): ICD-10-CM

## 2024-12-31 RX ORDER — PEN NEEDLE, DIABETIC 30 GX3/16"
1 NEEDLE, DISPOSABLE MISCELLANEOUS 4 TIMES DAILY
Qty: 400 EACH | Refills: 3 | Status: SHIPPED | OUTPATIENT
Start: 2024-12-31 | End: 2025-12-31

## 2024-12-31 RX ORDER — INSULIN LISPRO-AABC 100 [IU]/ML
5 INJECTION, SOLUTION SUBCUTANEOUS
Qty: 15 ML | Refills: 3 | Status: SHIPPED | OUTPATIENT
Start: 2024-12-31

## 2025-01-01 RX ORDER — TIRZEPATIDE 2.5 MG/.5ML
2.5 INJECTION, SOLUTION SUBCUTANEOUS WEEKLY
Qty: 2 ML | Refills: 11 | Status: SHIPPED | OUTPATIENT
Start: 2025-01-01

## 2025-01-01 NOTE — PATIENT INSTRUCTIONS
Increase Lyumjev to 5 units before meals  Decrease Mounjaro to 2.5mg once weekly  Continue all other medications  If you have any questions or concerns, call me at: [phone]   Our next phone call is scheduled for 1/28/25 at 3:00pm

## 2025-01-06 ENCOUNTER — HOSPITAL ENCOUNTER (OUTPATIENT)
Dept: GASTROENTEROLOGY | Facility: CLINIC | Age: 60
Discharge: HOME | End: 2025-01-06
Payer: COMMERCIAL

## 2025-01-06 VITALS
DIASTOLIC BLOOD PRESSURE: 77 MMHG | SYSTOLIC BLOOD PRESSURE: 126 MMHG | BODY MASS INDEX: 29.21 KG/M2 | WEIGHT: 197.2 LBS | RESPIRATION RATE: 14 BRPM | HEIGHT: 69 IN | HEART RATE: 62 BPM | OXYGEN SATURATION: 96 % | TEMPERATURE: 96.7 F

## 2025-01-06 DIAGNOSIS — Z12.11 ENCOUNTER FOR SCREENING COLONOSCOPY: Primary | ICD-10-CM

## 2025-01-06 PROCEDURE — 2500000004 HC RX 250 GENERAL PHARMACY W/ HCPCS (ALT 636 FOR OP/ED): Performed by: SURGERY

## 2025-01-06 PROCEDURE — 45378 DIAGNOSTIC COLONOSCOPY: CPT | Performed by: SURGERY

## 2025-01-06 PROCEDURE — 7100000010 HC PHASE TWO TIME - EACH INCREMENTAL 1 MINUTE

## 2025-01-06 PROCEDURE — 3700000012 HC SEDATION LEVEL 5+ TIME - INITIAL 15 MINUTES 5/> YEARS

## 2025-01-06 PROCEDURE — 99152 MOD SED SAME PHYS/QHP 5/>YRS: CPT | Performed by: SURGERY

## 2025-01-06 PROCEDURE — G0121 COLON CA SCRN NOT HI RSK IND: HCPCS | Performed by: SURGERY

## 2025-01-06 PROCEDURE — 7100000009 HC PHASE TWO TIME - INITIAL BASE CHARGE

## 2025-01-06 RX ORDER — MIDAZOLAM HYDROCHLORIDE 5 MG/ML
INJECTION, SOLUTION INTRAMUSCULAR; INTRAVENOUS AS NEEDED
Status: COMPLETED | OUTPATIENT
Start: 2025-01-06 | End: 2025-01-06

## 2025-01-06 RX ORDER — FENTANYL CITRATE 50 UG/ML
INJECTION, SOLUTION INTRAMUSCULAR; INTRAVENOUS AS NEEDED
Status: COMPLETED | OUTPATIENT
Start: 2025-01-06 | End: 2025-01-06

## 2025-01-06 RX ORDER — ONDANSETRON HYDROCHLORIDE 2 MG/ML
4 INJECTION, SOLUTION INTRAVENOUS ONCE AS NEEDED
Status: DISCONTINUED | OUTPATIENT
Start: 2025-01-06 | End: 2025-01-07 | Stop reason: HOSPADM

## 2025-01-06 RX ADMIN — FENTANYL CITRATE 25 MCG: 50 INJECTION, SOLUTION INTRAMUSCULAR; INTRAVENOUS at 11:42

## 2025-01-06 RX ADMIN — MIDAZOLAM HYDROCHLORIDE 3 MG: 5 INJECTION, SOLUTION INTRAMUSCULAR; INTRAVENOUS at 11:35

## 2025-01-06 RX ADMIN — FENTANYL CITRATE 50 MCG: 50 INJECTION, SOLUTION INTRAMUSCULAR; INTRAVENOUS at 11:35

## 2025-01-06 RX ADMIN — MIDAZOLAM HYDROCHLORIDE 2 MG: 5 INJECTION, SOLUTION INTRAMUSCULAR; INTRAVENOUS at 11:42

## 2025-01-06 ASSESSMENT — ENCOUNTER SYMPTOMS
NUMBNESS: 0
WEAKNESS: 0
DIFFICULTY URINATING: 0
CONSTIPATION: 0
DIARRHEA: 1
VOMITING: 0
ABDOMINAL PAIN: 0
SHORTNESS OF BREATH: 0
FEVER: 0
BLOOD IN STOOL: 0

## 2025-01-06 ASSESSMENT — COLUMBIA-SUICIDE SEVERITY RATING SCALE - C-SSRS
2. HAVE YOU ACTUALLY HAD ANY THOUGHTS OF KILLING YOURSELF?: NO
6. HAVE YOU EVER DONE ANYTHING, STARTED TO DO ANYTHING, OR PREPARED TO DO ANYTHING TO END YOUR LIFE?: NO
1. IN THE PAST MONTH, HAVE YOU WISHED YOU WERE DEAD OR WISHED YOU COULD GO TO SLEEP AND NOT WAKE UP?: NO

## 2025-01-06 ASSESSMENT — PAIN - FUNCTIONAL ASSESSMENT
PAIN_FUNCTIONAL_ASSESSMENT: 0-10

## 2025-01-06 ASSESSMENT — PAIN SCALES - GENERAL
PAINLEVEL_OUTOF10: 0 - NO PAIN

## 2025-01-06 NOTE — H&P
History Of Present Illness  Vince Valle is a 59 y.o. male presenting with follow-up 10 years for colonoscopy for screening.  He denies any blood in his stool.  He states he has some diarrhea but it is pretty much related to changing GLP-1 dosage.  He is seen with his wife..     Past Medical History  Past Medical History:   Diagnosis Date    Allergic rhinitis, unspecified     Allergic rhinitis, unspecified seasonality, unspecified trigger    Anxiety disorder, unspecified 10/07/2022    Anxiety    Diabetes mellitus (Multi)     Essential (primary) hypertension 10/07/2022    Hypertension, unspecified type    Hyperlipidemia, unspecified 03/04/2022    Hyperlipidemia, unspecified hyperlipidemia type       Surgical History  Past Surgical History:   Procedure Laterality Date    OTHER SURGICAL HISTORY  03/04/2020    Colonoscopy        Social History  He reports that he has quit smoking. His smoking use included cigarettes. He has never used smokeless tobacco. He reports that he does not currently use alcohol. He reports that he does not use drugs.    Family History  Family History   Problem Relation Name Age of Onset    Diabetes Other      Cancer Other          Allergies  Codeine, Cat dander, House dust, and Latex    Review of Systems   Constitutional:  Negative for fever.   Respiratory:  Negative for shortness of breath.    Cardiovascular:  Negative for chest pain.   Gastrointestinal:  Positive for diarrhea. Negative for abdominal pain, blood in stool, constipation and vomiting.   Genitourinary:  Negative for difficulty urinating.   Neurological:  Negative for weakness and numbness.        Physical Exam  Eyes:      General: No scleral icterus.  Cardiovascular:      Rate and Rhythm: Normal rate.   Pulmonary:      Effort: Pulmonary effort is normal.   Abdominal:      Palpations: Abdomen is soft.   Musculoskeletal:         General: Normal range of motion.   Neurological:      General: No focal deficit present.      Mental  Status: He is alert.          Assessment/Plan   Assessment & Plan  Encounter for screening colonoscopy    We discussed the indication for colonoscopy as well as the differential diagnosis including anything from no findings all the way up to a colon cancer.  We reviewed the prep as well as the risks and potential complications including but not limited to bleeding, infection, reaction to the anesthetic, stroke MI and/or death.  We discussed the risk of perforation or need for completion barium studies.  All questions were answered and she asked us to proceed.     Danette Grant MD

## 2025-01-06 NOTE — DISCHARGE INSTRUCTIONS
Patient Instructions after a Colonoscopy      The anesthetics, sedatives or narcotics which were given to you today will be acting in your body for the next 24 hours, so you might feel a little sleepy or groggy.  This feeling should slowly wear off. Carefully read and follow the instructions.     You received sedation today:  - Do not drive or operate any machinery or power tools of any kind.   - No alcoholic beverages today, not even beer or wine.  - Do not make any important decisions or sign any legal documents.  - No over the counter medications that contain alcohol or that may cause drowsiness.  - Do not make any important decisions or sign any legal documents.  - Make sure you have someone with you for first 24 hours.    While it is common to experience mild to moderate abdominal distention, gas, or belching after your procedure, if any of these symptoms occur following discharge from the GI Lab or within one week of having your procedure, call the [facility] to be advised whether a visit to your nearest Urgent Care or Emergency Department is indicated.  Take this paper with you if you go.     - If you develop an allergic reaction to the medications that were given during your procedure such as difficulty breathing, rash, hives, severe nausea, vomiting or lightheadedness.  - If you experience chest pain, shortness of breath, severe abdominal pain, fevers and chills.  -If you develop signs and symptoms of bleeding such as blood in your spit, if your stools turn black, tarry, or bloody  - If you have not urinated within 8 hours following your procedure.  - If your IV site becomes painful, red, inflamed, or looks infected.    If you received a biopsy/polypectomy/sphincterotomy the following instructions apply below:    __ Do not use Aspirin containing products, non-steroidal medications or anti-coagulants for one week following your procedure. (Examples of these types of medications are: Advil,  Arthrotec, Aleve, Coumadin, Ecotrin, Heparin, Ibuprofen, Indocin, Motrin, Naprosyn, Nuprin, Plavix, Vioxx, and Voltarin, or their generic forms.  This list is not all-inclusive.  Check with your physician or pharmacist before resuming medications.)   __ Eat a soft diet today.  Avoid foods that are poorly digested for the next 24 hours.  These foods would include: nuts, beans, lettuce, red meats, and fried foods. Start with liquids and advance your diet as tolerated, gradually work up to eating solids.   __ Do not have a Barium Study or Enema for one week.    Your physician recommends the additional following instructions:    -You have a contact number available for emergencies. The signs and symptoms of potential delayed complications were discussed with you. You may return to normal activities tomorrow.  -Resume your previous diet.  -Continue your present medications.   -We are waiting for your pathology results.  -Your physician has recommended a repeat colonoscopy (date to be determined after pending pathology results are reviewed) for surveillance based on pathology results.  -The findings and recommendations have been discussed with you.  -The findings and recommendations were discussed with your family.  - Please see Medication Reconciliation Form for new medication/medications prescribed.       If you experience any problems or have any questions following discharge from the GI Lab, please call:        Nurse Signature                                                                        Date___________________                                                                            Patient/Responsible Party Signature                                        Date___________________

## 2025-01-07 ENCOUNTER — TELEPHONE (OUTPATIENT)
Dept: SURGERY | Facility: CLINIC | Age: 60
End: 2025-01-07
Payer: COMMERCIAL

## 2025-01-07 NOTE — TELEPHONE ENCOUNTER
Left message to see how patient was doing after colonscopy. Provided office phone number.Pt needs to repeat in 10 years.

## 2025-01-22 DIAGNOSIS — E11.65 POORLY CONTROLLED TYPE 2 DIABETES MELLITUS (MULTI): ICD-10-CM

## 2025-01-22 RX ORDER — TIRZEPATIDE 2.5 MG/.5ML
2.5 INJECTION, SOLUTION SUBCUTANEOUS WEEKLY
Qty: 2 ML | Refills: 11 | Status: SHIPPED | OUTPATIENT
Start: 2025-01-22

## 2025-01-28 ENCOUNTER — APPOINTMENT (OUTPATIENT)
Dept: PHARMACY | Facility: HOSPITAL | Age: 60
End: 2025-01-28
Payer: COMMERCIAL

## 2025-01-28 DIAGNOSIS — E11.65 POORLY CONTROLLED TYPE 2 DIABETES MELLITUS (MULTI): ICD-10-CM

## 2025-01-28 NOTE — PATIENT INSTRUCTIONS
Increase Lantus from 15 --> 18 units daily  Increase Lyumjev from 5 --> 6 units before meals  Call pharmacy to try to refill Mounjaro, contact pharmacist if PA still has not gone through  Continue all other medications  If you have any questions or concerns, call me at: [phone]

## 2025-01-28 NOTE — Clinical Note
To provider, please attest CGM interpretation   To Aylin, patient is having issues getting 2.5mg dose from pharmacy. I submitted another PA today through [employer] but if you have any other tips or tricks or know if maybe an appeal is needed let me know, thank you!

## 2025-01-28 NOTE — PROGRESS NOTES
Patient is sent at the request of Jesús Mendoza MD for my opinion regarding diabetes.  My recommendations below will be communicated back to the requesting provider by way of shared medical record.    Recommendations:   Increase Lantus from 15 --> 18 units daily  Increase Lyumjev from 5 --> 6 units before meals  Call pharmacy to try to refill Mounjaro, contact PharmD if PA still has not gone through  Continue all other medications  ________________________________________________________________________    Subjective   Past Medical History:  Patient Active Problem List   Diagnosis    Allergic rhinitis    Anxiety    Hyperlipidemia    Hypertension    Type 2 diabetes mellitus with hyperglycemia, without long-term current use of insulin    Vitamin D deficiency    Wellness examination     Interim:  Vince Valle is a 59 y.o. male presents today for follow up visit with silvio PharmD for Type 2 Diabetes Mellitus. Last seen by myself (Lan Diehl) on 12/31/24 where pt was having side effects w/ Mounjaro 5mg, so dose decreased back to 2.5mg once weekly and Lyumjev increased from 3 --> 5 units w/ meals.    Today the patient denied acute concerns, but reports he has been having issues getting his Mounjaro from his pharmacy, Silvio submitted PA on 1/22. Also states he had to go without a kareem for a few days d/t issues getting from pharmacy.    Diabetes Pharmacotherapy:    Mounjaro 2.5mg weekly (Tuesday) - max tolerated dose d/t vomiting at higher doses  Has had trouble getting from pharmacy, missed this weeks dose  Metformin 1000 mg twice daily  Jardiance 25 mg once daily  Lantus 15 units daily  Lyumjev 5 units TID AC  Has 2-3x/day  Takes 5-10 minutes before meals     Adherence: previously reported missing 1 dose per week     Previously trialed meds:   Ozempic - vomitting  Glipizide - stopped when Lyumjev  Pioglitazone     Social:  Current diet: on average, 2-3 meals per day  Breakfast - usually light; rice cake  Lunch -  "vending machine at work; cheese chunks, beef jerky, or pretzels  Dinner - ham, scallop potatoes, macaroni and cheese  Snack - not normally, some sean sweets recently  Fluids - coffee (creamer), tea, diet pop  Exercise: Physically active Job. [phone] steps a day       Allergies:  Codeine, Cat dander, House dust, and Latex    Medication list:  Current Outpatient Medications   Medication Instructions    atorvastatin (LIPITOR) 40 mg, oral, Daily    blood pressure monitor kit Check BP  daily    blood-glucose sensor (FreeStyle Delfino 3 Plus Sensor) device Check blood sugar 3-4 times daily and change sensor every 15 days    cholecalciferol (VITAMIN D-3) 25 mcg    empagliflozin (JARDIANCE) 25 mg, oral, Daily    glipiZIDE (GLUCOTROL) 10 mg, oral, 2 times daily before meals    insulin glargine-yfgn (SEMGLEE(INSULIN GLARG-YFGN)PEN) 15 Units, subcutaneous, Every 24 hours, Take as directed per insulin instructions.    lisinopril 40 mg, oral, Daily    Lyumjev KwikPen U-100 Insulin 5 Units, subcutaneous, 3 times daily before meals, Take as directed per insulin instructions.    metFORMIN  mg 24 hr tablet Take 2 tablets (1,000 mg) by mouth 2 times daily (morning and late afternoon).    Mounjaro 2.5 mg, subcutaneous, Weekly    multivitamin tablet 1 tablet, Daily    PARoxetine (PAXIL) 30 mg, oral, Daily    pen needle, diabetic (BD Ultra-Fine Short Pen Needle) 31 gauge x 5/16\" needle 1 each, miscellaneous, 4 times daily, Use to inject insulin    pravastatin (PRAVACHOL) 20 mg, oral, Daily        Objective   Last Recorded Vitals:  BP Readings from Last 3 Encounters:   01/06/25 126/77   12/12/24 164/81   10/18/24 130/68     Wt Readings from Last 3 Encounters:   01/06/25 89.4 kg (197 lb 3.2 oz)   12/31/24 92.1 kg (203 lb)   12/12/24 92.1 kg (203 lb)     BMI Readings from Last 1 Encounters:   01/06/25 29.12 kg/m²      Labs  A1C  Lab Results   Component Value Date    HGBA1C 8.5 (H) 12/06/2024    HGBA1C 9.5 (H) 09/17/2024    " HGBA1C 8.5 (H) 02/09/2024     BMP/LFTs  Lab Results   Component Value Date    CREATININE 0.69 12/06/2024    CREATININE 0.69 09/17/2024    CREATININE 0.66 02/09/2024    EGFR >90 12/06/2024    EGFR >90 09/17/2024    EGFR >90 02/09/2024    GLUCOSE 217 (H) 12/06/2024     12/06/2024    K 4.3 12/06/2024     12/06/2024    CALCIUM 9.1 12/06/2024    CO2 27 12/06/2024    BUN 16 12/06/2024    ALT 22 04/07/2023    AST 17 04/07/2023    ALKPHOS 42 04/07/2023    BILITOT 0.5 04/07/2023     Lipids  Lab Results   Component Value Date    TRIG 100 12/06/2024    CHOL 192 12/06/2024    LDLF 102 (H) 04/07/2023    LDLCALC 117 (H) 12/06/2024    HDL 55.0 12/06/2024     Urine Albumin Creatinine Ratio  Lab Results   Component Value Date    MICROALBCREA 82.7 (H) 12/06/2024    MICROALBCREA 34.8 (H) 11/03/2023    MICROALBCREA 63.0 (H) 03/06/2021     ASCVD risk  The 10-year ASCVD risk score (Alexis GONZALEZ, et al., 2019) is: 16.3%    Values used to calculate the score:      Age: 59 years      Sex: Male      Is Non- : No      Diabetic: Yes      Tobacco smoker: No      Systolic Blood Pressure: 126 mmHg      Is BP treated: Yes      HDL Cholesterol: 55 mg/dL      Total Cholesterol: 192 mg/dL    Additional labs:  Lab Results   Component Value Date    VLZ04DE 0.00 09/30/2022       Home glucose monitoring:  Hypoglycemia: denies readings <70 mg/dL or s/sx of hypoglycemia  Had colonoscopy on 1/6 explaining lower readings on 1/5 and 1/6 d/t prep            Assessment/Plan   Type 2 Diabetes Mellitus  Goal A1C <6.5-7%, above goal as of 12/2024. GMI and TIR not at goal and appear to have increased, no hypoglycemia noted. Pt reports no ADRs w/ decreasing back to 2.5mg dose of Mounjaro, but when he tried to refill the 2.5mg dose the pharmacy sd insurance is rejecting and will only allow 5mg to go through. Will attempt another PA to get this to go through, as pt cannot tolerate 5mg dose and requires 2.5mg dose. In the meantime,  given degree of hyperglycemia will slightly adjust both basal and bolus insulin. At next appt, can consider adding sliding scale to Lyumjev.  Plan:  Increase Lantus from 15 --> 18 units daily  Increase Lyumjev from 5 --> 6 units before meals  Call pharmacy to try to refill Mounjaro, contact PharmD if PA still has not gone through  Continue all other medications  Home glucose monitoring:   Will continue FSL3+  A minimum of 72 hours of CGM data was reviewed and used to make therapy changes.   Education Provided to Patient:   Glycemic goals   Hypertension:   Goal BP <130/80  At goal in clinic last 2/3 checks  Unable to assess today, denies acute concerns  Can discuss at future visit  Primary prevention:   Therapy: High intensity statin , switched from pravastatin 20mg to atorvastatin 40mg on 12/12 by endo  LDL result does not yet meet goal (12/2024)  Due for repeat lipid panel in 1-2 months   Renal:  CKD: stage 1 - normal function  ACR:  (12/2024)  Renal protective agents: ACEi/ARB, SGLT2i, and GLP1  DM medications are dosed appropriately for renal function  Labs: up to date   PharmD follow-up: 1 month  Endo follow-up: 4/10/25    Patient agreeable to plan as above, contact information provided for any future questions or concerns.    Lan Diehl, Patti    Type of encounter: virtual  Provider on site: Jesús Mendoza    Continue all meds under the continuation of care with the referring provider and clinical pharmacy team.

## 2025-01-31 ENCOUNTER — TELEPHONE (OUTPATIENT)
Dept: ENDOCRINOLOGY | Facility: HOSPITAL | Age: 60
End: 2025-01-31
Payer: COMMERCIAL

## 2025-01-31 NOTE — TELEPHONE ENCOUNTER
"----- Message from Sherrie GARCIA sent at 1/31/2025  2:17 PM EST -----  Regarding: FW: Mounjaro samples 2.5 mg  Patient picked up 1 box of Mounjaro 2.5mg Lot#[id number] Exp 8/26/26  ----- Message -----  From: Sherrie Cochran MA  Sent: 1/29/2025   3:34 PM EST  To: Sherrie Cochran MA  Subject: FW: Mounjaro samples 2.5 mg                      Called patient and left voicemail  ----- Message -----  From: Shirley Riojas RN  Sent: 1/29/2025   2:05 PM EST  To: Sherrie Cochran MA  Subject: Mounjaro samples 2.5 mg                          Hi Venus  Please can you call patient and offer Mounjaro 2.5mg samples, his insurance wont pay for Mounjaro 2.5 mg   Ga Watson  ----- Message -----  From: Jesús Mendoza MD  Sent: 1/29/2025   1:19 PM EST  To: Shirley Riojas, RN    Jacky Watson,    I received this message from the clinical pharmacist can you please help?  \" Hello! I saw this patient yesterday and he still has not received his Mounjaro from his insurance and has now missed a dose because of this. The patient says that from the pharmacy the 5mg will go through but the 2.5mg wont go through ins. I submitted a PA yesterday and it was immediately denied. Is there anyone in your office that can do appeals for denied PA claims? I just want to make sure this gets taken care of for him and he said he lives too far away to get a sample from me.\"    Thanks   Jesús  "

## 2025-02-05 DIAGNOSIS — F41.9 ANXIETY: ICD-10-CM

## 2025-02-05 DIAGNOSIS — I10 PRIMARY HYPERTENSION: ICD-10-CM

## 2025-02-05 RX ORDER — PAROXETINE 30 MG/1
30 TABLET, FILM COATED ORAL DAILY
Qty: 90 TABLET | Refills: 0 | Status: SHIPPED | OUTPATIENT
Start: 2025-02-05

## 2025-02-05 RX ORDER — LISINOPRIL 40 MG/1
40 TABLET ORAL DAILY
Qty: 90 TABLET | Refills: 0 | Status: SHIPPED | OUTPATIENT
Start: 2025-02-05

## 2025-02-25 ENCOUNTER — APPOINTMENT (OUTPATIENT)
Dept: PHARMACY | Facility: HOSPITAL | Age: 60
End: 2025-02-25
Payer: COMMERCIAL

## 2025-03-03 NOTE — PROGRESS NOTES
Patient is sent at the request of Jesús Mendoza MD for my opinion regarding diabetes.  My recommendations below will be communicated back to the requesting provider by way of shared medical record.    Recommendations:   Increase Lantus to 20 units once daily  Continue Lyumjev 6 units with meals but now add SS#2  Continue all other medications  Alert me if you are still unable to  Mounjaro, if that is the case will try to switch to Trulicity  ________________________________________________________________________    Subjective   Past Medical History:  Patient Active Problem List   Diagnosis    Allergic rhinitis    Anxiety    Hyperlipidemia    Hypertension    Type 2 diabetes mellitus with hyperglycemia, without long-term current use of insulin    Vitamin D deficiency    Wellness examination     Interim:  Vince Valle is a 59 y.o. male presents today for follow up visit with endo PharmEMILY for Type 2 Diabetes Mellitus. Last seen by myself (Lan Diehl) on 1/28/25 where Lantus increased from 16 --> 18 units daily, and Lyumjev increased from 5 --> 6 units daily. In the interim, pt has been having issues getting Mounjaro from insurance, appears to have needed a PA and has been getting samples from endo.    Today the patient reports he last tried to fill Mounjaro in mid April and was still unable to  his supply. Denies acute concerns, but states his blood sugars have remained high. Has been monitoring blood pressure at home, states readings have been ~120s/80s.    Diabetes Pharmacotherapy:    Mounjaro 2.5mg weekly (Tuesday) - max tolerated dose d/t vomiting at higher doses  Has had trouble getting from pharmacy, has been getting samples from endo  Has 3 weeks left at home  Metformin 1000 mg twice daily  Jardiance 25 mg once daily  Lantus 18 units daily  Lyumjev 6 units TID AC  Has 2-3 meals/day  Takes 5-10 minutes before meals     Adherence: previously reported missing 1 dose per week if anything    "  Previously trialed meds:   Ozempic - vomitting  Glipizide - stopped when Lyumjev started  Pioglitazone     Social:  Current diet: on average, 2-3 meals per day  Breakfast - usually light; rice cake  Lunch - vending machine at work; cheese chunks, beef jerky, or pretzels  Dinner - ham, scallop potatoes, macaroni and cheese  Snack - not normally, some sean sweets recently  Fluids - coffee (creamer), tea, diet pop  Exercise: Physically active Job. [phone] steps a day       Allergies:  Codeine, Cat dander, House dust, and Latex    Medication list:  Current Outpatient Medications   Medication Instructions    atorvastatin (LIPITOR) 40 mg, oral, Daily    blood pressure monitor kit Check BP  daily    blood-glucose sensor (FreeStyle Delfino 3 Plus Sensor) device Check blood sugar 3-4 times daily and change sensor every 15 days    cholecalciferol (VITAMIN D-3) 25 mcg    empagliflozin (JARDIANCE) 25 mg, oral, Daily    glipiZIDE (GLUCOTROL) 10 mg, oral, 2 times daily before meals    insulin glargine-yfgn (SEMGLEE(INSULIN GLARG-YFGN)PEN) 15 Units, subcutaneous, Every 24 hours, Take as directed per insulin instructions.    lisinopril 40 mg, oral, Daily    Lyumjev KwikPen U-100 Insulin 5 Units, subcutaneous, 3 times daily before meals, Take as directed per insulin instructions.    metFORMIN  mg 24 hr tablet Take 2 tablets (1,000 mg) by mouth 2 times daily (morning and late afternoon).    Mounjaro 2.5 mg, subcutaneous, Weekly    multivitamin tablet 1 tablet, Daily    PARoxetine (PAXIL) 30 mg, oral, Daily    pen needle, diabetic (BD Ultra-Fine Short Pen Needle) 31 gauge x 5/16\" needle 1 each, miscellaneous, 4 times daily, Use to inject insulin    pravastatin (PRAVACHOL) 20 mg, oral, Daily        Objective   Last Recorded Vitals:  BP Readings from Last 3 Encounters:   01/06/25 126/77   12/12/24 164/81   10/18/24 130/68     Wt Readings from Last 3 Encounters:   01/06/25 89.4 kg (197 lb 3.2 oz)   12/31/24 92.1 kg (203 lb) "   12/12/24 92.1 kg (203 lb)     BMI Readings from Last 1 Encounters:   01/06/25 29.12 kg/m²      Labs  A1C  Lab Results   Component Value Date    HGBA1C 8.5 (H) 12/06/2024    HGBA1C 9.5 (H) 09/17/2024    HGBA1C 8.5 (H) 02/09/2024     BMP/LFTs  Lab Results   Component Value Date    CREATININE 0.69 12/06/2024    CREATININE 0.69 09/17/2024    CREATININE 0.66 02/09/2024    EGFR >90 12/06/2024    EGFR >90 09/17/2024    EGFR >90 02/09/2024    GLUCOSE 217 (H) 12/06/2024     12/06/2024    K 4.3 12/06/2024     12/06/2024    CALCIUM 9.1 12/06/2024    CO2 27 12/06/2024    BUN 16 12/06/2024    ALT 22 04/07/2023    AST 17 04/07/2023    ALKPHOS 42 04/07/2023    BILITOT 0.5 04/07/2023     Lipids  Lab Results   Component Value Date    TRIG 100 12/06/2024    CHOL 192 12/06/2024    LDLF 102 (H) 04/07/2023    LDLCALC 117 (H) 12/06/2024    HDL 55.0 12/06/2024     Urine Albumin Creatinine Ratio  Lab Results   Component Value Date    MICROALBCREA 82.7 (H) 12/06/2024    MICROALBCREA 34.8 (H) 11/03/2023    MICROALBCREA 63.0 (H) 03/06/2021     ASCVD risk  The 10-year ASCVD risk score (Alexis GONZALEZ, et al., 2019) is: 16.3%    Values used to calculate the score:      Age: 59 years      Sex: Male      Is Non- : No      Diabetic: Yes      Tobacco smoker: No      Systolic Blood Pressure: 126 mmHg      Is BP treated: Yes      HDL Cholesterol: 55 mg/dL      Total Cholesterol: 192 mg/dL    Additional labs:  Lab Results   Component Value Date    IRT56ND 0.00 09/30/2022       Home glucose monitoring:  Hypoglycemia: denies readings <70 mg/dL or s/sx of hypoglycemia        Assessment/Plan   Type 2 Diabetes Mellitus  Goal A1C <6.5-7%, above goal as of 12/2024. GMI and TIR not at goal and appear to have stayed similar to last appt, no hypoglycemia noted. Pt continues to struggle to get Mounjaro from pharmacy, at last visit noted that the med needed a PA. Will have patient try to refill Mounjaro now, and I will reach back  out to Dr. Mendoza's office to check on status of PA. If PA denied, will switch to Trulicity 1.5mg weekly. In the mean time, will add sliding scale to Lyumjev and increase lantus given elevated readings.  Plan:  Increase Lantus to 20 units once daily  Continue Lyumjev 6 units with meals but now add SS#2  Continue all other medications  Alert me if you are still unable to  Mounjaro, if that is the case will try to switch to Trulicity  Home glucose monitoring:   Will continue FSL3+  A minimum of 72 hours of CGM data was reviewed and used to make therapy changes.   Education Provided to Patient:   Glycemic goals   Hypertension:   Goal BP <130/80  At goal in clinic last 2/3 checks  Self-reports home readings are ~120s/80s, did not have exact readings to review  No changes made today  Primary prevention:   Therapy: High intensity statin , switched from pravastatin 20mg to atorvastatin 40mg on 12/12 by endo  LDL result does not yet meet goal (12/2024)  Due for repeat lipid panel in 1 month   Renal:  CKD: stage 1 - normal function  ACR:  (12/2024)  Renal protective agents: ACEi/ARB, SGLT2i, and GLP1  DM medications are dosed appropriately for renal function  Labs: endo ordered updated RFP, lipids, and A1C  PharmD follow-up: 2 months  Endo follow-up: 4/8/25    Patient agreeable to plan as above, contact information provided for any future questions or concerns.    Lan Diehl, PharmD    Type of encounter: virtual    Continue all meds under the continuation of care with the referring provider and clinical pharmacy team.

## 2025-03-04 ENCOUNTER — APPOINTMENT (OUTPATIENT)
Dept: PHARMACY | Facility: HOSPITAL | Age: 60
End: 2025-03-04
Payer: COMMERCIAL

## 2025-03-04 DIAGNOSIS — E11.65 POORLY CONTROLLED TYPE 2 DIABETES MELLITUS (MULTI): Primary | ICD-10-CM

## 2025-03-04 RX ORDER — INSULIN LISPRO-AABC 100 [IU]/ML
5 INJECTION, SOLUTION SUBCUTANEOUS
Qty: 30 ML | Refills: 3 | Status: SHIPPED | OUTPATIENT
Start: 2025-03-04

## 2025-03-04 RX ORDER — INSULIN GLARGINE-YFGN 100 [IU]/ML
20 INJECTION, SOLUTION SUBCUTANEOUS EVERY 24 HOURS
Qty: 15 ML | Refills: 3 | Status: SHIPPED | OUTPATIENT
Start: 2025-03-04 | End: 2026-04-08

## 2025-03-04 NOTE — Clinical Note
To provider, please attest CGM interpretation. Additionally, I see the patient has been getting samples from your office so far given issues with Mounjaro PA. I discussed this with the patient, since I can't see the status of the PA I asked him to try to refill the Mounjaro and to let me know if it doesn't go through, can you or someone in your office see any updates on the PA? If it was denied then we could switch him to Trulicity given his past ADRs with Ozempic.

## 2025-03-04 NOTE — PATIENT INSTRUCTIONS
Increase Lantus to 20 units once daily  Continue Lyumjev 6 units with meals, but add the following scale based on pre-meal blood sugars:  If blood sugar is < 150 add 0 units   If blood sugar is 150-200 add 2 units   If blood sugar is 201-250 add 4 units  If blood sugar is 251-300 add 6 units   If blood sugar is 301-350 add 8 units   If blood sugar is 351-400 add 10 units   If blood sugar is 401-450 add 12 units  If blood sugar is over 450, call the office     Continue all other medications  Alert me if you are still unable to  Mounjaro, if that is the case will try to switch to Trulicity  If you have any questions or concerns, call me at: [phone]

## 2025-04-04 DIAGNOSIS — F41.9 ANXIETY: ICD-10-CM

## 2025-04-04 DIAGNOSIS — I10 PRIMARY HYPERTENSION: ICD-10-CM

## 2025-04-04 RX ORDER — LISINOPRIL 40 MG/1
40 TABLET ORAL DAILY
Qty: 90 TABLET | Refills: 1 | Status: SHIPPED | OUTPATIENT
Start: 2025-04-04

## 2025-04-04 RX ORDER — PAROXETINE 30 MG/1
30 TABLET, FILM COATED ORAL DAILY
Qty: 90 TABLET | Refills: 1 | Status: SHIPPED | OUTPATIENT
Start: 2025-04-04

## 2025-04-06 LAB
ALBUMIN SERPL-MCNC: 4.5 G/DL (ref 3.6–5.1)
BUN SERPL-MCNC: 21 MG/DL (ref 7–25)
BUN/CREAT SERPL: ABNORMAL (CALC) (ref 6–22)
CALCIUM SERPL-MCNC: 9.7 MG/DL (ref 8.6–10.3)
CHLORIDE SERPL-SCNC: 105 MMOL/L (ref 98–110)
CHOLEST SERPL-MCNC: 119 MG/DL
CHOLEST/HDLC SERPL: 2.4 (CALC)
CO2 SERPL-SCNC: 25 MMOL/L (ref 20–32)
CREAT SERPL-MCNC: 0.7 MG/DL (ref 0.7–1.35)
EGFRCR SERPLBLD CKD-EPI 2021: 105 ML/MIN/1.73M2
EST. AVERAGE GLUCOSE BLD GHB EST-MCNC: 269 MG/DL
EST. AVERAGE GLUCOSE BLD GHB EST-SCNC: 14.9 MMOL/L
GLUCOSE SERPL-MCNC: 242 MG/DL (ref 65–99)
HBA1C MFR BLD: 11 % OF TOTAL HGB
HDLC SERPL-MCNC: 50 MG/DL
LDLC SERPL CALC-MCNC: 53 MG/DL (CALC)
NONHDLC SERPL-MCNC: 69 MG/DL (CALC)
PHOSPHATE SERPL-MCNC: 4.5 MG/DL (ref 2.5–4.5)
POTASSIUM SERPL-SCNC: 4.8 MMOL/L (ref 3.5–5.3)
SODIUM SERPL-SCNC: 140 MMOL/L (ref 135–146)
TRIGL SERPL-MCNC: 79 MG/DL

## 2025-04-08 ENCOUNTER — PHARMACY VISIT (OUTPATIENT)
Dept: PHARMACY | Facility: CLINIC | Age: 60
End: 2025-04-08
Payer: COMMERCIAL

## 2025-04-08 ENCOUNTER — APPOINTMENT (OUTPATIENT)
Facility: CLINIC | Age: 60
End: 2025-04-08
Payer: COMMERCIAL

## 2025-04-08 VITALS
SYSTOLIC BLOOD PRESSURE: 177 MMHG | DIASTOLIC BLOOD PRESSURE: 75 MMHG | HEART RATE: 71 BPM | TEMPERATURE: 97.3 F | WEIGHT: 196 LBS | BODY MASS INDEX: 29.03 KG/M2 | HEIGHT: 69 IN

## 2025-04-08 DIAGNOSIS — E11.65 TYPE 2 DIABETES MELLITUS WITH HYPERGLYCEMIA, WITHOUT LONG-TERM CURRENT USE OF INSULIN: ICD-10-CM

## 2025-04-08 DIAGNOSIS — E78.49 OTHER HYPERLIPIDEMIA: ICD-10-CM

## 2025-04-08 DIAGNOSIS — E11.65 POORLY CONTROLLED TYPE 2 DIABETES MELLITUS (MULTI): Primary | ICD-10-CM

## 2025-04-08 PROCEDURE — 3077F SYST BP >= 140 MM HG: CPT | Performed by: STUDENT IN AN ORGANIZED HEALTH CARE EDUCATION/TRAINING PROGRAM

## 2025-04-08 PROCEDURE — RXMED WILLOW AMBULATORY MEDICATION CHARGE

## 2025-04-08 PROCEDURE — 4010F ACE/ARB THERAPY RXD/TAKEN: CPT | Performed by: STUDENT IN AN ORGANIZED HEALTH CARE EDUCATION/TRAINING PROGRAM

## 2025-04-08 PROCEDURE — 3008F BODY MASS INDEX DOCD: CPT | Performed by: STUDENT IN AN ORGANIZED HEALTH CARE EDUCATION/TRAINING PROGRAM

## 2025-04-08 PROCEDURE — 99214 OFFICE O/P EST MOD 30 MIN: CPT | Performed by: STUDENT IN AN ORGANIZED HEALTH CARE EDUCATION/TRAINING PROGRAM

## 2025-04-08 PROCEDURE — 3078F DIAST BP <80 MM HG: CPT | Performed by: STUDENT IN AN ORGANIZED HEALTH CARE EDUCATION/TRAINING PROGRAM

## 2025-04-08 PROCEDURE — 95251 CONT GLUC MNTR ANALYSIS I&R: CPT | Performed by: STUDENT IN AN ORGANIZED HEALTH CARE EDUCATION/TRAINING PROGRAM

## 2025-04-08 NOTE — PATIENT INSTRUCTIONS
Increase lantus to 25 units daily  Increase Lyumjev 6 units with breakfast if you are working and 10 units if you are not, 8 units with lunch and 10 units with dinner. Add insulin sliding scale 2 units for every 50 > 150 mg/dl  Trulicity 0.75 mg weekly. If they do not have it ready let me know take Mounjaro 2.5 mg sample for the next 4 weeks while we work on prior aujthorization  Continue Metformin 1000 mg BID and Jardiance  Continue atorvastatin and lisnipril    Follow with pharm D    RTC in August  RTC in

## 2025-04-08 NOTE — PROGRESS NOTES
"Patient coming in for follow up for T2DM    Subjective   Vince Valle is a 59 y.o. male who presents for follow up for Type 2 diabetes mellitus.      Lab Results   Component Value Date    HGBA1C 11.0 (H) 04/05/2025      Mr. Valle is a 59 year old M with hx of T2DM, HTN and HLD coming for Diabetes management  Diabetes dx > 10 yrs ago  Last A1c April 2025 11%  Last seen in November and Jardiance increased to 25 mg daily  After last visit we started patient on lantus, glipizide and restarted ozempic  July restarted ozempic 0.25 1st shot was okay but with the second vomited a lot so stopped it  Started to mounjaro last visit did not have any side effects. It was increased to 5 mg but developed side effects so it was decreased again.  He was followed by pharm D last in March 2025 at that time lantus was increased to 20 units and Lyumjev to 6 units with ISS, mounjaro, metformin and Jardiance   current meds:  Metformin 1000 mg twice daily  Jardiance 25 mg once daily  Lantus 18-20 units  Lyumjev to 6 units with ISS  Mounjaro last couple weeks ago  Lisinopril  Pravastatin      Today BG in 400s. Had a couple breakfast burritos and a diet coke  BG [id number]  TIR: 19% 0% low  High all day especially with dinner  Issues with diet likes his carbs  Breakfast 6 am coffee with cream and grannola bar  Friday breakfast with his father and eats way more around 8  Saw ophthalmology no retinopathy rtc in 1 year  No numbness or tingling  Nephropathy on lisinopril  On atorvastatin  last LDL: 53  Physically active Job. [phone] steps a day     Review of Systems  all pertinent systems reviewed and are otherwise negative   Objective   /75 (BP Location: Right arm, Patient Position: Sitting, BP Cuff Size: Adult)   Pulse 71   Temp 36.3 °C (97.3 °F)   Ht 1.753 m (5' 9\")   Wt 88.9 kg (196 lb)   BMI 28.94 kg/m²   Physical Exam  Constitutional:       General: He is not in acute distress.     Appearance: Normal appearance.   HENT:      " Head: Normocephalic and atraumatic.   Eyes:      Extraocular Movements: Extraocular movements intact.      Pupils: Pupils are equal, round, and reactive to light.   Cardiovascular:      Rate and Rhythm: Normal rate and regular rhythm.   Pulmonary:      Effort: Pulmonary effort is normal. No respiratory distress.      Breath sounds: Normal breath sounds.   Abdominal:      General: Bowel sounds are normal.      Palpations: Abdomen is soft.      Tenderness: There is no abdominal tenderness.   Skin:     Coloration: Skin is not jaundiced or pale.      Findings: No erythema or rash.   Neurological:      General: No focal deficit present.      Mental Status: He is alert and oriented to person, place, and time.      Deep Tendon Reflexes: Reflexes normal.   Psychiatric:         Mood and Affect: Mood normal.         Behavior: Behavior normal.         Lab Review  GLUCOSE (mg/dL)   Date Value   04/05/2025 242 (H)     Glucose (mg/dL)   Date Value   12/06/2024 217 (H)   09/17/2024 197 (H)   02/09/2024 153 (H)     HEMOGLOBIN A1c (% of total Hgb)   Date Value   04/05/2025 11.0 (H)     Hemoglobin A1C (%)   Date Value   12/06/2024 8.5 (H)   09/17/2024 9.5 (H)   02/09/2024 8.5 (H)     CARBON DIOXIDE (mmol/L)   Date Value   04/05/2025 25     Bicarbonate (mmol/L)   Date Value   12/06/2024 27   09/17/2024 27   02/09/2024 27     UREA NITROGEN (BUN) (mg/dL)   Date Value   04/05/2025 21     Urea Nitrogen (mg/dL)   Date Value   12/06/2024 16   09/17/2024 20   02/09/2024 17     Creatinine (mg/dL)   Date Value   12/06/2024 0.69   09/17/2024 0.69   02/09/2024 0.66     CREATININE (mg/dL)   Date Value   04/05/2025 0.70     Lab Results   Component Value Date    CHOL 119 04/05/2025    CHOL 192 12/06/2024    CHOL 158 11/03/2023     Lab Results   Component Value Date    HDL 50 04/05/2025    HDL 55.0 12/06/2024    HDL 45.0 11/03/2023     Lab Results   Component Value Date    LDLCALC 53 04/05/2025    LDLCALC 117 (H) 12/06/2024    LDLCALC 82 11/03/2023  "    Lab Results   Component Value Date    TRIG 79 04/05/2025    TRIG 100 12/06/2024    TRIG 153 (H) 11/03/2023     No components found for: \"CHOLHDL\"   Lab Results   Component Value Date    TSH 2.63 02/28/2022     Assessment/Plan   Mr. Valle is a 59 year old M with hx of T2DM, HTN and HLD coming for Diabetes management  Diabetes dx > 10 yrs ago  Last A1c April 2025 11%  Started to mounjaro last visit did not have any side effects. It was increased to 5 mg but developed side effects so it was decreased again.  He was followed by pharm D last in March 2025 at that time lantus was increased to 20 units and Lyumjev to 6 units with ISS, mounjaro, metformin and Jardiance   current meds:  Metformin 1000 mg twice daily  Jardiance 25 mg once daily  Lantus 18-20 units  Lyumjev to 6 units with ISS  Mounjaro last couple weeks ago  Lisinopril  Pravastatin  CGM reviewed  AVG  TIR: 19% 0%low  Saw ophthalmology no retinopathy rtc in 1 year  No numbness or tingling  Nephropathy on lisinopril  On atorvastatin  last LDL: 53  Physically active Job. [phone] steps a day  Plan:  Increase lantus to 25 units daily  Increase Lyumjev 6 units with breakfast if you are working and 10 units if you are not, 8 units with lunch and 10 units with dinner. Add insulin sliding scale 2 units for every 50 > 150 mg/dl  Trulicity 0.75 mg weekly. If they do not have it ready let me know take Mounjaro 2.5 mg sample for the next 4 weeks while we work on prior aujthorization  Continue Metformin 1000 mg BID and Jardiance  Continue atorvastatin and lisnipril  Follow with ophthlmology  Follow with pharm D    RTC in August    Assessment & Plan  Poorly controlled type 2 diabetes mellitus (Multi)    Orders:    dulaglutide (Trulicity) 0.75 mg/0.5 mL pen injector; Inject 0.75 mg under the skin 1 (one) time per week.    Hemoglobin A1C; Future    Lipid Panel; Future    Renal Function Panel; Future    Other hyperlipidemia         Type 2 diabetes mellitus with " hyperglycemia, without long-term current use of insulin           .

## 2025-04-10 ENCOUNTER — APPOINTMENT (OUTPATIENT)
Facility: CLINIC | Age: 60
End: 2025-04-10
Payer: COMMERCIAL

## 2025-04-30 DIAGNOSIS — E11.65 POORLY CONTROLLED TYPE 2 DIABETES MELLITUS (MULTI): ICD-10-CM

## 2025-04-30 NOTE — PROGRESS NOTES
Trulicity refills changed from [facility] to [facility] pharmacy per patient request.     Marsha Simpson, PharmD

## 2025-05-05 DIAGNOSIS — E11.65 POORLY CONTROLLED TYPE 2 DIABETES MELLITUS (MULTI): ICD-10-CM

## 2025-05-05 RX ORDER — METFORMIN HYDROCHLORIDE 500 MG/1
TABLET, EXTENDED RELEASE ORAL
Qty: 120 TABLET | Refills: 3 | Status: SHIPPED | OUTPATIENT
Start: 2025-05-05

## 2025-05-08 ENCOUNTER — APPOINTMENT (OUTPATIENT)
Dept: PHARMACY | Facility: HOSPITAL | Age: 60
End: 2025-05-08
Payer: COMMERCIAL

## 2025-05-27 NOTE — PROGRESS NOTES
Patient is sent at the request of Jesús Mendoza MD for my opinion regarding diabetes.  My recommendations below will be communicated back to the requesting provider by way of shared medical record.    Recommendations:   Increase Lantus to 28 units once daily  Increase Humalog to 7 units with breakfast if you are working or 11 units with breakfast if you are not, 9 units with lunch, and 12 units with dinner in addition to SS#2  Continue all other medications  ________________________________________________________________________    Subjective   Past Medical History:  Problem List[1]      HPI:  Vince Valle is a 60 y.o. male who presents today for follow up visit with endo PharmD for Type 2 Diabetes Mellitus  Last seen by myself (Lan Diehl) on 3/4 where Lanuts increased to 20 units daily and for Lyumjev SS#2 added w/ meals  In the interim, pt missed 5/8 pharmd appt    Today:   Since Starting Trulicity patient has been tolerating well  Still having frequent hyperglycemia  Has been monitoring blood pressure at home, states readings have been ~120s/80s.  Denies acute concerns    Diabetes Pharmacotherapy:    Trulicity 0.75mg weekly (Mondays)  Has had 4 doses so far  Denies ADRs  Metformin 1000 mg twice daily  Jardiance 25 mg once daily  Lantus 25 units daily  Lyumjev (6/10)-8-10 units TID AC + SS#2  ^ units for breakfast if not working, 10 units if working  Adding 2-4 units from scale  Taking ~3x/day  Takes 5-10 minutes before meals     Adherence: previously reported missing 1 dose per week if anything     Previously trialed meds:   Ozempic - vomiting  Mounjaro - GI ADRs  Glipizide - stopped when Lyumjev started  Pioglitazone     Social:  Current diet: on average, 2-3 meals per day  Breakfast - rice cake, granola bar, or bagel  Lunch - vending machine at work; cheese chunks, beef jerky, or pretzels  Dinner - ham, scallop potatoes, macaroni and cheese  Snack - not normally, some sean sweets  "recently  Fluids - coffee (creamer), tea, diet pop  Exercise: Physically active Job. [id number] steps a day       Allergies:  Codeine, Cat dander, House dust, and Latex    Medication list:  Current Outpatient Medications   Medication Instructions    atorvastatin (LIPITOR) 40 mg, oral, Daily    blood pressure monitor kit Check BP  daily    blood-glucose sensor (FreeStyle Delfino 3 Plus Sensor) device Check blood sugar 3-4 times daily and change sensor every 15 days    cholecalciferol (VITAMIN D-3) 25 mcg    dulaglutide (TRULICITY) 0.75 mg, subcutaneous, Weekly    empagliflozin (JARDIANCE) 25 mg, oral, Daily    insulin glargine-yfgn (SEMGLEE(INSULIN GLARG-YFGN)PEN) 20 Units, subcutaneous, Every 24 hours, Take as directed per insulin instructions.    lisinopril 40 mg, oral, Daily    Lyumjev KwikPen U-100 Insulin 5 Units, subcutaneous, 3 times daily before meals, Please also add sliding scale based on blood sugars, max of 36 units per day. Take as directed per insulin instructions.    metFORMIN  mg 24 hr tablet Do not crush, chew, or split.  Take 2 tablets by mouth twice daily    Mounjaro 2.5 mg, subcutaneous, Weekly    multivitamin tablet 1 tablet, Daily    PARoxetine (PAXIL) 30 mg, oral, Daily    pen needle, diabetic (BD Ultra-Fine Short Pen Needle) 31 gauge x 5/16\" needle 1 each, miscellaneous, 4 times daily, Use to inject insulin    pravastatin (PRAVACHOL) 20 mg, oral, Daily        Objective   Last Recorded Vitals:  BP Readings from Last 3 Encounters:   04/08/25 177/75   01/06/25 126/77   12/12/24 164/81     Wt Readings from Last 3 Encounters:   04/08/25 88.9 kg (196 lb)   01/06/25 89.4 kg (197 lb 3.2 oz)   12/31/24 92.1 kg (203 lb)     BMI Readings from Last 1 Encounters:   04/08/25 28.94 kg/m²      Labs  A1C  Lab Results   Component Value Date    HGBA1C 11.0 (H) 04/05/2025    HGBA1C 8.5 (H) 12/06/2024    HGBA1C 9.5 (H) 09/17/2024     BMP/LFTs  Lab Results   Component Value Date    CREATININE 0.70 04/05/2025    " CREATININE 0.69 12/06/2024    CREATININE 0.69 09/17/2024    EGFR 105 04/05/2025    EGFR >90 12/06/2024    EGFR >90 09/17/2024    GLUCOSE 242 (H) 04/05/2025     04/05/2025    K 4.8 04/05/2025     04/05/2025    CALCIUM 9.7 04/05/2025    CO2 25 04/05/2025    BUN 21 04/05/2025    ALT 22 04/07/2023    AST 17 04/07/2023    ALKPHOS 42 04/07/2023    BILITOT 0.5 04/07/2023     Lipids  Lab Results   Component Value Date    TRIG 79 04/05/2025    CHOL 119 04/05/2025    LDLF 102 (H) 04/07/2023    LDLCALC 53 04/05/2025    HDL 50 04/05/2025     Urine Albumin Creatinine Ratio  Lab Results   Component Value Date    MICROALBCREA 82.7 (H) 12/06/2024    MICROALBCREA 34.8 (H) 11/03/2023    MICROALBCREA 63.0 (H) 03/06/2021     ASCVD risk  The ASCVD Risk score (Alexis GONZALEZ, et al., 2019) failed to calculate for the following reasons:    The valid total cholesterol range is 130 to 320 mg/dL    Additional labs:  Lab Results   Component Value Date    DRG41SP 0.00 09/30/2022       Home glucose monitoring:  Hypoglycemia: denies readings <70 mg/dL or s/sx of hypoglycemia        Assessment/Plan   Type 2 Diabetes Mellitus  Goal A1C <6.5-7%, above goal and further increased as of 4/2025. GMI and TIR not at goal and appear to have stayed similar to last appt, no hypoglycemia noted. Tolerating switch to Trulicity well, however pt has also tolerated low GLP1 doses before then had side effects at higher doses. D/t this will continue low dose Trulicity for at least 2-3 months before considering an increase. Will increase basal and prandial insulin today d/t hyperglycemia throughout the day.  Plan:  Increase Lantus to 28 units once daily  Increase Humalog to 7 units with breakfast if you are working or 11 units with breakfast if you are not, 9 units with lunch, and 12 units with dinner in addition to SS#2  Continue all other medications  Home glucose monitoring:   Will continue FSL3+  A minimum of 72 hours of CGM data was reviewed and used to  make therapy changes.   Education Provided to Patient:   Glycemic goals   Hypertension:   Goal BP <130/80  Above goal in clinic last 2/3 checks  Self-reports home readings are ~120s/80s, did not have exact readings to review  No changes made today  Primary prevention:   Therapy: High intensity statin  LDL meets goal (4/2025)  Renal:  CKD: stage 1 - normal function  ACR:  (12/2024)  Renal protective agents: ACEi/ARB, SGLT2i, and GLP1  DM medications are dosed appropriately for renal function  Labs: up to date  PharmD follow-up: 1 month  Endo follow-up: 8/21/25    Patient agreeable to plan as above, contact information provided for any future questions or concerns.    Lan Diehl, PharmD    Type of encounter: virtual    Continue all meds under the continuation of care with the referring provider and clinical pharmacy team.           [1]   Patient Active Problem List  Diagnosis    Allergic rhinitis    Anxiety    Hyperlipidemia    Hypertension    Type 2 diabetes mellitus with hyperglycemia, without long-term current use of insulin    Vitamin D deficiency    Wellness examination

## 2025-05-29 ENCOUNTER — APPOINTMENT (OUTPATIENT)
Dept: PHARMACY | Facility: HOSPITAL | Age: 60
End: 2025-05-29
Payer: COMMERCIAL

## 2025-05-29 DIAGNOSIS — E11.65 POORLY CONTROLLED TYPE 2 DIABETES MELLITUS (MULTI): ICD-10-CM

## 2025-05-30 NOTE — PATIENT INSTRUCTIONS
Increase Lantus to 28 units once daily  Increase Humalog to 7 units with breakfast if you are working or 11 units with breakfast if you are not, 9 units with lunch, and 12 units with dinner in addition to the sliding scale before meals:  If blood sugar is < 150 add 0 units   If blood sugar is 150-200 add 2 units   If blood sugar is 201-250 add 4 units  If blood sugar is 251-300 add 6 units   If blood sugar is 301-350 add 8 units   If blood sugar is 351-400 add 10 units   If blood sugar is 401-450 add 12 units  If blood sugar is over 450, call the office     Continue all other medications  If you have any questions or concerns, call me at: [phone]

## 2025-06-25 LAB
ESTIMATED AVERAGE GLUCOSE FOR HBA1C EXTERNAL: NORMAL
ESTIMATED AVERAGE GLUCOSE FOR HBA1C EXTERNAL: NORMAL
HEMOGLOBIN A1C/HEMOGLOBIN TOTAL IN BLOOD EXTERNAL: 11 %
HEMOGLOBIN A1C/HEMOGLOBIN TOTAL IN BLOOD EXTERNAL: 8.5 %

## 2025-07-01 ENCOUNTER — APPOINTMENT (OUTPATIENT)
Dept: PHARMACY | Facility: HOSPITAL | Age: 60
End: 2025-07-01
Payer: COMMERCIAL

## 2025-07-20 DIAGNOSIS — E11.65 POORLY CONTROLLED TYPE 2 DIABETES MELLITUS (MULTI): ICD-10-CM

## 2025-08-05 ENCOUNTER — TELEPHONE (OUTPATIENT)
Dept: ENDOCRINOLOGY | Facility: CLINIC | Age: 60
End: 2025-08-05
Payer: COMMERCIAL

## 2025-08-05 DIAGNOSIS — E11.65 TYPE 2 DIABETES MELLITUS WITH HYPERGLYCEMIA, WITHOUT LONG-TERM CURRENT USE OF INSULIN: ICD-10-CM

## 2025-08-05 DIAGNOSIS — E11.65 POORLY CONTROLLED TYPE 2 DIABETES MELLITUS (MULTI): ICD-10-CM

## 2025-08-05 RX ORDER — INSULIN LISPRO-AABC 100 [IU]/ML
INJECTION, SOLUTION SUBCUTANEOUS
Qty: 30 ML | Refills: 3 | Status: SHIPPED | OUTPATIENT
Start: 2025-08-05

## 2025-08-05 RX ORDER — METFORMIN HYDROCHLORIDE 500 MG/1
TABLET, EXTENDED RELEASE ORAL
Qty: 360 TABLET | Refills: 3 | Status: SHIPPED | OUTPATIENT
Start: 2025-08-05

## 2025-08-05 NOTE — TELEPHONE ENCOUNTER
[facility] pharmacy   1) jardiance 25mg every day #90; 2) metformin HCL ER 500mg 2 tab BID #360; 3)lyumjev 100u/ml kwik pen 8-10 units TID AC + SS#2 (max daily dose 60 units)

## 2025-08-11 DIAGNOSIS — E11.65 POORLY CONTROLLED TYPE 2 DIABETES MELLITUS (MULTI): ICD-10-CM

## 2025-08-12 ENCOUNTER — APPOINTMENT (OUTPATIENT)
Dept: PHARMACY | Facility: HOSPITAL | Age: 60
End: 2025-08-12
Payer: COMMERCIAL

## 2025-08-12 DIAGNOSIS — E11.65 POORLY CONTROLLED TYPE 2 DIABETES MELLITUS (MULTI): Primary | ICD-10-CM

## 2025-08-12 RX ORDER — BLOOD-GLUCOSE SENSOR
EACH MISCELLANEOUS
Qty: 3 EACH | Refills: 11 | Status: SHIPPED | OUTPATIENT
Start: 2025-08-12

## 2025-08-12 RX ORDER — BLOOD-GLUCOSE SENSOR
EACH MISCELLANEOUS
Refills: 11 | OUTPATIENT
Start: 2025-08-12

## 2025-08-21 ENCOUNTER — APPOINTMENT (OUTPATIENT)
Facility: CLINIC | Age: 60
End: 2025-08-21
Payer: COMMERCIAL

## 2025-08-21 DIAGNOSIS — E11.65 POORLY CONTROLLED TYPE 2 DIABETES MELLITUS (MULTI): ICD-10-CM

## 2025-08-21 RX ORDER — METFORMIN HYDROCHLORIDE 500 MG/1
1000 TABLET, EXTENDED RELEASE ORAL
Qty: 120 TABLET | Refills: 3 | Status: SHIPPED | OUTPATIENT
Start: 2025-08-21

## 2025-08-30 DIAGNOSIS — E11.65 POORLY CONTROLLED TYPE 2 DIABETES MELLITUS (MULTI): ICD-10-CM

## 2025-09-02 RX ORDER — DULAGLUTIDE 0.75 MG/.5ML
0.75 INJECTION, SOLUTION SUBCUTANEOUS
Qty: 2 ML | Refills: 11 | Status: SHIPPED | OUTPATIENT
Start: 2025-09-07

## 2025-09-25 ENCOUNTER — APPOINTMENT (OUTPATIENT)
Dept: PHARMACY | Facility: HOSPITAL | Age: 60
End: 2025-09-25
Payer: COMMERCIAL

## 2025-10-17 ENCOUNTER — APPOINTMENT (OUTPATIENT)
Age: 60
End: 2025-10-17
Payer: COMMERCIAL

## 2026-04-16 ENCOUNTER — APPOINTMENT (OUTPATIENT)
Dept: ENDOCRINOLOGY | Facility: CLINIC | Age: 61
End: 2026-04-16
Payer: COMMERCIAL